# Patient Record
Sex: FEMALE | Race: AMERICAN INDIAN OR ALASKA NATIVE
[De-identification: names, ages, dates, MRNs, and addresses within clinical notes are randomized per-mention and may not be internally consistent; named-entity substitution may affect disease eponyms.]

---

## 2022-07-11 ENCOUNTER — HOSPITAL ENCOUNTER (INPATIENT)
Dept: HOSPITAL 5 - ED | Age: 77
LOS: 8 days | Discharge: NURSING, REHAB OR LONG-TERM CARE | DRG: 481 | End: 2022-07-19
Attending: INTERNAL MEDICINE | Admitting: HOSPITALIST
Payer: MEDICARE
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
## 2022-07-11 DIAGNOSIS — Z95.1: ICD-10-CM

## 2022-07-11 DIAGNOSIS — E78.5: ICD-10-CM

## 2022-07-11 DIAGNOSIS — D72.829: ICD-10-CM
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
## 2022-07-11 DIAGNOSIS — I10: ICD-10-CM

## 2022-07-11 DIAGNOSIS — Z20.822: ICD-10-CM

## 2022-07-11 DIAGNOSIS — Z82.49: ICD-10-CM

## 2022-07-11 DIAGNOSIS — E87.1: ICD-10-CM
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
## 2022-07-11 DIAGNOSIS — S72.142A: Primary | ICD-10-CM

## 2022-07-11 DIAGNOSIS — Z88.6: ICD-10-CM

## 2022-07-11 DIAGNOSIS — E66.9: ICD-10-CM

## 2022-07-11 LAB
BASOPHILS # (AUTO): 0.1 K/MM3 (ref 0–0.1)
BASOPHILS NFR BLD AUTO: 0.6 % (ref 0–1.8)
BUN SERPL-MCNC: 28 MG/DL (ref 7–17)
BUN/CREAT SERPL: 22 %
CALCIUM SERPL-MCNC: 9.3 MG/DL (ref 8.4–10.2)
EOSINOPHIL # BLD AUTO: 0.2 K/MM3 (ref 0–0.4)
EOSINOPHIL NFR BLD AUTO: 1.9 % (ref 0–4.3)
HCT VFR BLD CALC: 32.6 % (ref 30.3–42.9)
HEMOLYSIS INDEX: 4
HGB BLD-MCNC: 10.8 GM/DL (ref 10.1–14.3)
LYMPHOCYTES # BLD AUTO: 1.3 K/MM3 (ref 1.2–5.4)
LYMPHOCYTES NFR BLD AUTO: 9.8 % (ref 13.4–35)
MCHC RBC AUTO-ENTMCNC: 33 % (ref 30–34)
MCV RBC AUTO: 93 FL (ref 79–97)
MONOCYTES # (AUTO): 0.6 K/MM3 (ref 0–0.8)
MONOCYTES % (AUTO): 4.8 % (ref 0–7.3)
PLATELET # BLD: 187 K/MM3 (ref 140–440)
RBC # BLD AUTO: 3.49 M/MM3 (ref 3.65–5.03)

## 2022-07-11 PROCEDURE — 83735 ASSAY OF MAGNESIUM: CPT

## 2022-07-11 PROCEDURE — 84295 ASSAY OF SERUM SODIUM: CPT

## 2022-07-11 PROCEDURE — U0003 INFECTIOUS AGENT DETECTION BY NUCLEIC ACID (DNA OR RNA); SEVERE ACUTE RESPIRATORY SYNDROME CORONAVIRUS 2 (SARS-COV-2) (CORONAVIRUS DISEASE [COVID-19]), AMPLIFIED PROBE TECHNIQUE, MAKING USE OF HIGH THROUGHPUT TECHNOLOGIES AS DESCRIBED BY CMS-2020-01-R: HCPCS

## 2022-07-11 PROCEDURE — C1769 GUIDE WIRE: HCPCS

## 2022-07-11 PROCEDURE — 80053 COMPREHEN METABOLIC PANEL: CPT

## 2022-07-11 PROCEDURE — 85014 HEMATOCRIT: CPT

## 2022-07-11 PROCEDURE — 85018 HEMOGLOBIN: CPT

## 2022-07-11 PROCEDURE — 80048 BASIC METABOLIC PNL TOTAL CA: CPT

## 2022-07-11 PROCEDURE — 72131 CT LUMBAR SPINE W/O DYE: CPT

## 2022-07-11 PROCEDURE — 36415 COLL VENOUS BLD VENIPUNCTURE: CPT

## 2022-07-11 PROCEDURE — 94640 AIRWAY INHALATION TREATMENT: CPT

## 2022-07-11 PROCEDURE — 94760 N-INVAS EAR/PLS OXIMETRY 1: CPT

## 2022-07-11 PROCEDURE — C1713 ANCHOR/SCREW BN/BN,TIS/BN: HCPCS

## 2022-07-11 PROCEDURE — 85025 COMPLETE CBC W/AUTO DIFF WBC: CPT

## 2022-07-11 NOTE — CAT SCAN REPORT
CT LUMBAR SPINE WITHOUT CONTRAST



INDICATION:  fall with pain. 



TECHNIQUE:  Axial imaging performed through the lumbar spine without the use of contrast.  Sagittal a
nd coronal reconstructed images were also reviewed.  All CT scans at this location are performed usin
g CT dose reduction for ALARA by means of automated exposure control. 



COMPARISON:  None



FINDINGS: 



Alignment:  There is moderate levocurvature of the lumbar spine measuring approximately 20 degrees wi
th apex near the elbow 3 4 level. There is 3 mm anterolisthesis of L4 with respect to L5 on the sagit
jose luis images.  

Bones:  There is no acute osseous abnormality.  Moderate discogenic DJD is identified at L3-4 and L4-
5. 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 Large bridging right lateral osteophyte is
 identified at L3-4.  There is moderate diffuse facet arthropathy with hypertrophic changes. Moderate
 to severe central canal stenosis is suspected at L3-4 and L4-5. Bilateral neural foraminal narrowing
 is also suspected at L3-4 and L4-5. 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 

Soft tissues:  No acute or significant incidental soft tissue abnormality.



IMPRESSION:  Moderate levo curvature of the lumbar spine with moderate to severe multilevel degenerat
chuy changes as described. L3-4 and L4-5 appear to be the most affected levels. No acute injury is det
ected.



Signer Name: Vinh Mercado Jr, MD 

Signed: 7/11/2022 1:28 PM

Workstation Name: [id number]

## 2022-07-11 NOTE — CAT SCAN REPORT
CT LEFT HIP WITHOUT CONTRAST



INDICATION / CLINICAL INFORMATION: fall with pain.



TECHNIQUE: All CT scans at this location are performed using CT dose reduction for ALARA by means of 
automated exposure control.



COMPARISON: None available.



FINDINGS:



HIP JOINT: Mild DJD of the left hip

BONES: There is an acute basicervical fracture with mild impaction and moderate angulation. No osseou
s lesion.

SACROILIAC JOINT(S): No significant abnormality.

MUSCLES / TENDONS: No significant abnormality.

SOFT TISSUES: No significant abnormality.



LOWER LUMBAR SPINE: Scattered degenerative disc disease.

SOFT TISSUE WITHIN PELVIS: No acute findings.



ADDITIONAL FINDINGS: None.



IMPRESSION:

1. Acute basicervical fracture of the left proximal femur with mild impaction and moderate angulation
.



Signer Name: Ryder Land DO 

Signed: 7/11/2022 1:54 PM

Workstation Name: [id number]

## 2022-07-11 NOTE — HISTORY AND PHYSICAL REPORT
History of Present Illness


Date of examination: 07/11/22


Date of admission: 


07/11/22


Chief complaint: 





Fall


Left hip pain





History of present illness: 





7-year-old female with history of hypertension was brought to the emergency room

because of left hip pain 1 day.  Patient  was getting up from her couch and had

a mechanical trip and fell to the left hip area.  Patient denies any other 

injuries or trauma.  Denies any neck or back pain.  Denies any other complaints 

or symptoms.  Denies any LOC.  Denies denies any head injuries or trauma.  

Patient denies any numbness, tingling, fever, chills, nausea, vomiting, chest 

pain, shortness of breath, headache, stiff neck.  Patient denies any joint 

swelling or joint redness.  Patient states has decreased range of motion and 

gait due to pain.  





In the emergency room CT scan of the lower extremity shows acute fracture of the

left proximal femur with mild impaction and moderate angulation.  Subsequently 

Case was discussed with Dr. Valdez's were going to admit the patient orthopedic 

Dr. Vargas will see the patient in consultation.  Patient is a [facility] patient





Past History


Past Medical History: hypertension


Past Surgical History: No surgical history


Social history: no significant social history


Family history: hypertension





Medications and Allergies


                                    Allergies











Allergy/AdvReac Type Severity Reaction Status Date / Time


 


codeine Allergy  Unknown Verified 07/11/22 09:26











Active Meds: 


Active Medications





Acetaminophen (Acetaminophen 325 Mg Tab)  650 mg PO Q4H PRN


   PRN Reason: Pain MILD(1-3)/Fever >100.5/HA











Review of Systems


All systems: negative


Constitutional: other (Fall left hip pain)





Exam





- Constitutional


Vitals: 


                                        











Temp Pulse Resp BP Pulse Ox


 


 98.4 F   73   18   192/89   99 


 


 07/11/22 09:22  07/11/22 09:22  07/11/22 09:22  07/11/22 14:27  07/11/22 12:31











General appearance: Present: no acute distress, well-nourished





- EENT


Eyes: Present: PERRL


ENT: hearing intact, clear oral mucosa





- Neck


Neck: Present: supple, normal ROM





- Respiratory


Respiratory effort: normal


Respiratory: bilateral: CTA





- Cardiovascular


Heart Sounds: Present: S1 & S2.  Absent: rub, click





- Extremities


Extremities: pulses symmetrical, No edema


Extremity abnormal: other (Fall and left hip pain)


Peripheral Pulses: within normal limits





- Abdominal


General gastrointestinal: Present: soft, non-tender, non-distended, normal bowel

sounds


Female genitourinary: Present: normal





- Integumentary


Integumentary: Present: clear, warm, dry





- Musculoskeletal


Musculoskeletal: gait normal, strength equal bilaterally





- Psychiatric


Psychiatric: appropriate mood/affect, intact judgment & insight





- Neurologic


Neurologic: CNII-XII intact, moves all extremities





Results





- Labs


CBC & Chem 7: 


                                 07/11/22 10:30





                                 07/11/22 10:30


Labs: 


                             Laboratory Last Values











WBC  13.2 K/mm3 (4.5-11.0)  H  07/11/22  10:30    


 


RBC  3.49 M/mm3 (3.65-5.03)  L  07/11/22  10:30    


 


Hgb  10.8 gm/dl (10.1-14.3)   07/11/22  10:30    


 


Hct  32.6 % (30.3-42.9)   07/11/22  10:30    


 


MCV  93 fl (79-97)   07/11/22  10:30    


 


MCH  31 pg (28-32)   07/11/22  10:30    


 


MCHC  33 % (30-34)   07/11/22  10:30    


 


RDW  14.0 % (13.2-15.2)   07/11/22  10:30    


 


Plt Count  187 K/mm3 (140-440)   07/11/22  10:30    


 


Lymph % (Auto)  9.8 % (13.4-35.0)  L  07/11/22  10:30    


 


Mono % (Auto)  4.8 % (0.0-7.3)   07/11/22  10:30    


 


Eos % (Auto)  1.9 % (0.0-4.3)   07/11/22  10:30    


 


Baso % (Auto)  0.6 % (0.0-1.8)   07/11/22  10:30    


 


Lymph # (Auto)  1.3 K/mm3 (1.2-5.4)   07/11/22  10:30    


 


Mono # (Auto)  0.6 K/mm3 (0.0-0.8)   07/11/22  10:30    


 


Eos # (Auto)  0.2 K/mm3 (0.0-0.4)   07/11/22  10:30    


 


Baso # (Auto)  0.1 K/mm3 (0.0-0.1)   07/11/22  10:30    


 


Seg Neutrophils %  82.9 % (40.0-70.0)  H  07/11/22  10:30    


 


Seg Neutrophils #  10.9 K/mm3 (1.8-7.7)  H  07/11/22  10:30    


 


Sodium  128 mmol/L (137-145)  L  07/11/22  10:30    


 


Potassium  4.1 mmol/L (3.6-5.0)   07/11/22  10:30    


 


Chloride  94.0 mmol/L ()  L  07/11/22  10:30    


 


Carbon Dioxide  24 mmol/L (22-30)   07/11/22  10:30    


 


Anion Gap  14 mmol/L  07/11/22  10:30    


 


BUN  28 mg/dL (7-17)  H  07/11/22  10:30    


 


Creatinine  1.3 mg/dL (0.6-1.2)  H  07/11/22  10:30    


 


Estimated GFR  48 ml/min  07/11/22  10:30    


 


BUN/Creatinine Ratio  22 %  07/11/22  10:30    


 


Glucose  118 mg/dL ()  H  07/11/22  10:30    


 


Calcium  9.3 mg/dL (8.4-10.2)   07/11/22  10:30    














Assessment and Plan


VTE prophylaxis?: Chemical


Plan of care discussed with patient/family: Yes





- Patient Problems


(1) Femur fracture, left


Current Visit: Yes   Status: Acute   


Qualifiers: 


   Encounter type: initial encounter   Femur location: unspecified portion of 

femur   Fracture type: closed   Fracture morphology: unspecified fracture 

morphology   Qualified Code(s): S72.92XA - Unspecified fracture of left femur, 

initial encounter for closed fracture   


Plan to address problem: 


Admit the patient to the medical floor telemetry.  NPO.  D5 half-normal saline 

at the rate of 100 cc per hour.  Morphine 2 mg IV every 4 hours as needed.  

Tylenol 650 mg p.o. every 6 hours as needed.  Reconsult orthopedic Dr. Valdez to

see the patient for possible surgery








(2) Hypertension


Current Visit: Yes   Status: Acute   


Plan to address problem: 


Hydralazine 10 mg IV every 6 hours as needed.  We continue the home medication








(3) Fall


Current Visit: Yes   Status: Acute   


Plan to address problem: 


Patient is on fall precaution.  Will consult physical therapy for evaluation








(4) Hyponatremia


Current Visit: Yes   Status: Acute   


Plan to address problem: 


D5 half-normal saline at the rate of 100 cc/h.  We will monitor the sodium 

closely.  Recheck BMP in the morning








(5) DVT prophylaxis


Current Visit: Yes   Status: Acute   


Plan to address problem: 


Heparin 5000 units subcu every 12 hours for DVT prophylaxis.  Pepcid 20 mg IV 

every 12 hours for GI prophylaxis.  Patient is a full code

## 2022-07-11 NOTE — EMERGENCY DEPARTMENT REPORT
ED Lower Extremity HPI





- General


Chief Complaint: Extremity Injury, Lower


Stated Complaint: FALL/LEFT HIP PAIN


Time Seen by Provider: 22 09:59


Source: patient, EMS


Mode of arrival: Stretcher


Limitations: Physical Limitation





- History of Present Illness


Initial Comments: 





This is a 77-year-old female nontoxic, well nourished in appearance, presents to

the ED with c/o of left hip pain 1 day.  Patient stated that last night she was

getting up from her couch and had a mechanical trip and fell to the left hip 

area.  Patient denies any other injuries or trauma.  Denies any neck or back 

pain.  Denies any other complaints or symptoms.  Denies any LOC.  Denies denies 

any head injuries or trauma.  Patient denies any numbness, tingling, fever, 

chills, nausea, vomiting, chest pain, shortness of breath, headache, stiff neck.

 Patient denies any joint swelling or joint redness.  Patient states has 

decreased range of motion and gait due to pain.  Patient stated allergies to 

codeine but stated has taken Morphin in the past without any allergies.


MD Complaint: hip injury


-: days(s) (1)


Injury: Hip: Left


Place: home


Severity: mild


Severity scale (0 -10): 8


Improves With: immobilization


Worsens With: weight bearing, movement, palpation


Context: fall


Associated Symptoms: unable to bear weight.  denies: snap/pop sensation, 

swelling, numbness, tingling





- Related Data


                                    Allergies











Allergy/AdvReac Type Severity Reaction Status Date / Time


 


codeine Allergy  Unknown Verified 22 09:26














ED Review of Systems


ROS: 


Stated complaint: FALL/LEFT HIP PAIN


Other details as noted in HPI





Comment: All other systems reviewed and negative


Constitutional: denies: chills, fever


Eyes: denies: eye pain, eye discharge, vision change


ENT: denies: ear pain, throat pain


Respiratory: denies: cough, shortness of breath, wheezing


Cardiovascular: denies: chest pain, palpitations


Endocrine: no symptoms reported


Gastrointestinal: denies: abdominal pain, nausea, diarrhea


Genitourinary: denies: urgency, dysuria, discharge


Musculoskeletal: denies: back pain, joint swelling, arthralgia


Skin: denies: rash, lesions


Neurological: denies: headache, weakness, paresthesias


Psychiatric: denies: anxiety, depression


Hematological/Lymphatic: denies: easy bleeding, easy bruising





ED Physical Exam





- General


General appearance: alert, in no apparent distress





- Head


Head exam: Present: atraumatic, normocephalic





- Eye


Eye exam: Present: normal appearance, PERRL, EOMI


Pupils: Present: normal accommodation





- ENT


ENT exam: Present: normal exam





- Neck


Neck exam: Present: normal inspection, full ROM.  Absent: tenderness, 

meningismus, lymphadenopathy





- Respiratory


Respiratory exam: Present: normal lung sounds bilaterally.  Absent: respiratory 

distress, wheezes, rales, rhonchi, stridor, chest wall tenderness, accessory 

muscle use, decreased breath sounds, prolonged expiratory





- Cardiovascular


Cardiovascular Exam: Present: regular rate, normal rhythm, normal heart sounds. 

Absent: bradycardia, tachycardia, irregular rhythm, systolic murmur, diastolic 

murmur, rubs, gallop





- GI/Abdominal


GI/Abdominal exam: Present: soft, normal bowel sounds.  Absent: distended, 

tenderness, guarding, rebound, rigid, diminished bowel sounds





- Extremities Exam


Extremities exam: Present: tenderness, normal capillary refill.  Absent: pedal 

edema, joint swelling, calf tenderness





- Expanded Lower Extremity Exam


  ** Left


Hip exam: Present: full ROM (bilateral exam: with pain), tenderness (left hip 

only), external rotation (bilateral exam), internal rotation (bilateral exam), 

pelvic stability (bilateral exam).  Absent: swelling (bilateral exam), abrasion 

(bilateral exam), laceration (bilateral exam), ecchymosis (bilateral exam), 

deformity (bilateral exam), crepidus (bilateral exam), dislocation (bilateral 

exam), erythema (bilateral exam), shortening (bilateral exam)


Upper Leg exam: Present: normal inspection (bilateral exam), full ROM (bilateral

exam).  Absent: tenderness (bilateral exam), swelling (bilateral exam)


Knee exam: Present: normal inspection (bilateral exam), full ROM (bilateral 

exam).  Absent: tenderness (bilateral exam), swelling (bilateral exam)


Lower Leg exam: Present: normal inspection (bilateral exam), full ROM (bilateral

exam).  Absent: tenderness, swelling


Ankle exam: Present: normal inspection (bilateral exam), full ROM (bilateral 

exam).  Absent: tenderness (bilateral exam), swelling (bilateral exam)


Foot/Toe exam: Present: normal inspection (bilateral exam), full ROM (bilateral 

exam).  Absent: tenderness (bilateral exam), swelling (bilateral exam)


Neuro vascular tendon exam: Present: no vascular compromise (bilateral exam)


Gait: Positive: unable to bear weight (left hip)





- Back Exam


Back exam: Present: normal inspection, full ROM.  Absent: tenderness, CVA 

tenderness (R), CVA tenderness (L), muscle spasm, paraspinal tenderness, 

vertebral tenderness, rash noted





- Neurological Exam


Neurological exam: Present: alert, oriented X3





- Psychiatric


Psychiatric exam: Present: normal affect, normal mood





- Skin


Skin exam: Present: warm, dry, intact, normal color.  Absent: rash





ED Course


                                   Vital Signs











  22





  09:22 10:12 10:15


 


Temperature 98.4 F  


 


Pulse Rate 73  


 


Respiratory 18  





Rate   


 


Blood Pressure   216/71


 


Blood Pressure 227/94  





[Left]   


 


O2 Sat by Pulse 97 99 100





Oximetry   














  22





  10:31 10:45 11:01


 


Temperature   


 


Pulse Rate   


 


Respiratory   





Rate   


 


Blood Pressure 209/78 209/78 227/77


 


Blood Pressure   





[Left]   


 


O2 Sat by Pulse 99 99 98





Oximetry   














  22





  11:15 11:31 11:45


 


Temperature   


 


Pulse Rate   


 


Respiratory   





Rate   


 


Blood Pressure 224/71 172/61 172/61


 


Blood Pressure   





[Left]   


 


O2 Sat by Pulse 99 99 98





Oximetry   














  22





  12:01 12:15 12:31


 


Temperature   


 


Pulse Rate   


 


Respiratory   





Rate   


 


Blood Pressure 166/55 191/70 210/84


 


Blood Pressure   





[Left]   


 


O2 Sat by Pulse 97 99 99





Oximetry   














  22





  13:28 13:38 13:47


 


Temperature   


 


Pulse Rate   


 


Respiratory   





Rate   


 


Blood Pressure 269/169 187/76 189/82


 


Blood Pressure   





[Left]   


 


O2 Sat by Pulse   





Oximetry   














  22





  14:08 14:27


 


Temperature  


 


Pulse Rate  


 


Respiratory  





Rate  


 


Blood Pressure 191/90 192/89


 


Blood Pressure  





[Left]  


 


O2 Sat by Pulse  





Oximetry  














- Reevaluation(s)


Reevaluation #1: 





22 10:40


Patient is speaking in full sentences with no signs of distress noted.





- Consultations


Consultation #1: 





22 14:46


Patient has been consulted with Dr. Graham about patient history, physical exam, 

and labs/imaging results and agrees to ED plan of care with consult to 

orthopedic and admission.


Consultation #2: 





22 14:52


Spoke with Dr. Valdez office and given patient information and was instructed 

that Dr. Valdez will come and visit patient after office clinic.


Consultation #3: 





22 17:16


Patient has been consulted with Dr. Valdez about patient history, physical exam,

 and labs/imaging results and agrees for admission for surgery.








22 17:22


Patient is a [facility] Patient.  I will contact [facility] for approval for admissions.





22 17:36


Patient has been consulted with Dr. Wheeler ([facility]) about patient history, 

physical exam, and labs/imaging studies and patient to be transferred to [facility] for further evaluation and appropriate treatment.





22 18:30


Patient accepted by Dr. Garcia ([place] [facility]).











ED Lower Extremity MDM





- Lab Data


Result diagrams: 


                                 22 10:30





                                 22 10:30





- Radiology Data





[facility]  


                                     [address] [place] [address]  


 


                                          Cat Scan Report   


                                               Signed  


 


Patient: LUISA GONSALES                                                         

       MR#: [id number]  


89          


: 1945                                                                

Acct:[id number]      


 


Age/Sex: 77 / F                                                                

ADM Date: 22     


 


Loc: ED       


Attending Dr:   


 


 


Ordering Physician: ROMEL CLARK NP  


Date of Service: 22  


Procedure(s): CT lower extremity LT wo con  


Accession Number(s): [id number]  


 


cc: ROMEL CLARK NP   


 


 


CT LEFT HIP WITHOUT CONTRAST  


 


 INDICATION / CLINICAL INFORMATION: fall with pain.  


 


 TECHNIQUE: All CT scans at this location are performed using CT dose reduction 

for ALARA by means 


of automated exposure control.  


 


 COMPARISON: None available.  


 


 FINDINGS:  


 


 HIP JOINT: Mild DJD of the left hip  


 BONES: There is an acute basicervical fracture with mild impaction and moderate

 angulation. No 


osseous lesion.  


 SACROILIAC JOINT(S): No significant abnormality.  


 MUSCLES / TENDONS: No significant abnormality.  


 SOFT TISSUES: No significant abnormality.  


 


 LOWER LUMBAR SPINE: Scattered degenerative disc disease.  


 SOFT TISSUE WITHIN PELVIS: No acute findings.  


 


 ADDITIONAL FINDINGS: None.  


 


 IMPRESSION:  


 1. Acute basicervical fracture of the left proximal femur with mild impaction 

and moderate 


angulation.  


 


 Signer Name: Ryder Zambrano DO   


 Signed: 2022 1:54 PM  


 Workstation Name: [id number]   


 


 


Transcribed By: NS  


Dictated By: RYDER ZAMBRANO DO  


Electronically Authenticated By: RYDER ZAMBRANO DO    


Signed Date/Time: 22 1354                                


 


 


 


DD/DT: 22 1348                                                            

  


TD/TT:





[facility]  


                                     [address] [place] [address]  


 


                                          Cat Scan Report   


                                               Signed  


 


Patient: LUISA GONSALES                                                         

       MR#: [id number]  


89          


: 1945                                                                

Acct:[id number]      


 


Age/Sex: 77 / F                                                                

ADM Date: 22     


 


Loc: ED       


Attending Dr:   


 


 


Ordering Physician: ROMEL CLARK NP  


Date of Service: 22  


Procedure(s): CT lumbar spine wo con  


Accession Number(s): [id number]  


 


cc: ROMEL CLARK NP   


 


 


CT LUMBAR SPINE WITHOUT CONTRAST  


 


 INDICATION:  fall with pain.   


 


 TECHNIQUE:  Axial imaging performed through the lumbar spine without the use of

 contrast.  Sagittal


and coronal reconstructed images were also reviewed.  All CT scans at this 

location are performed 


using CT dose reduction for ALARA by means of automated exposure control.   


 


 COMPARISON:  None  


 


 FINDINGS:   


 


 Alignment:  There is moderate levocurvature of the lumbar spine measuring 

approximately 20 degrees 


with apex near the elbow 3 4 level. There is 3 mm anterolisthesis of L4 with 

respect to L5 on the 


sagittal images.    


 Bones:  There is no acute osseous abnormality.  Moderate discogenic DJD is 

identified at L3-4 and 


L4-5. 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 Large bridging 

right lateral 


osteophyte is identified at L3-4.  There is moderate diffuse facet arthropathy 

with hypertrophic 


changes. Moderate to severe central canal stenosis is suspected at L3-4 and L4-

5. Bilateral neural 


foraminal narrowing is also suspected at L3-4 and L4-5. Ankylosis of the 

superior right SI joint is 


present.   


 Soft tissues:  No acute or significant incidental soft tissue abnormality.  


 


 IMPRESSION:  Moderate levo curvature of the lumbar spine with moderate to 

severe multilevel 


degenerative changes as described. L3-4 and L4-5 appear to be the most affected 

levels. No acute 


injury is detected.  


 


 Signer Name: Vinh Mercado Jr, MD   


 Signed: 2022 1:28 PM  


 Workstation Name: [id number]   


 


 


Transcribed By: TTR  


Dictated By: VINH MERCADO JR, MD  


Electronically Authenticated By: VINH MERCADO JR, MD    


Signed Date/Time: 22                                


 


 


 


DD/DT: 22                                                            

  


TD/TT:





- Medical Decision Making





77-year-old female that presents with left hip fracture.  Patient is stable and 

was examined by me.  Patient is notified of the CT results with no questions 

noted by the patient.  At this time patient is pain is under control.  Vital 

signs are stable.  Patient transferred to [facility] for further 

evaluation and treatment.  At time of transport, the patient does not seem toxic

 or ill in appearance.  No acute signs of distress noted.  Patient agrees to 

transfer treatment plan of care.  No further questions noted by the patient.


Critical care attestation.: 


If time is entered above; I have spent that time in minutes in the direct care 

of this critically ill patient, excluding procedure time.








ED Disposition


Clinical Impression: 


Femur fracture, left


Qualifiers:


 Encounter type: initial encounter Femur location: unspecified portion of femur 

Fracture type: closed Fracture morphology: unspecified fracture morphology 

Qualified Code(s): S72.92XA - Unspecified fracture of left femur, initial 

encounter for closed fracture





Disposition: 04 [facility] CARE FACILITY


Is pt being admited?: No


Condition: Stable


Time of Disposition: 18:33

## 2022-07-12 LAB
ALBUMIN SERPL-MCNC: 3.6 G/DL (ref 3.9–5)
ALT SERPL-CCNC: 15 UNITS/L (ref 7–56)
BASOPHILS # (AUTO): 0.1 K/MM3 (ref 0–0.1)
BASOPHILS NFR BLD AUTO: 0.6 % (ref 0–1.8)
BUN SERPL-MCNC: 24 MG/DL (ref 7–17)
BUN/CREAT SERPL: 22 %
CALCIUM SERPL-MCNC: 9.5 MG/DL (ref 8.4–10.2)
EOSINOPHIL # BLD AUTO: 0.4 K/MM3 (ref 0–0.4)
EOSINOPHIL NFR BLD AUTO: 2.7 % (ref 0–4.3)
HCT VFR BLD CALC: 35.4 % (ref 30.3–42.9)
HEMOLYSIS INDEX: 28
HGB BLD-MCNC: 11.7 GM/DL (ref 10.1–14.3)
LYMPHOCYTES # BLD AUTO: 1.3 K/MM3 (ref 1.2–5.4)
LYMPHOCYTES NFR BLD AUTO: 8.8 % (ref 13.4–35)
MCHC RBC AUTO-ENTMCNC: 33 % (ref 30–34)
MCV RBC AUTO: 94 FL (ref 79–97)
MONOCYTES # (AUTO): 0.7 K/MM3 (ref 0–0.8)
MONOCYTES % (AUTO): 4.8 % (ref 0–7.3)
PLATELET # BLD: 195 K/MM3 (ref 140–440)
RBC # BLD AUTO: 3.76 M/MM3 (ref 3.65–5.03)

## 2022-07-12 PROCEDURE — 0QS736Z REPOSITION LEFT UPPER FEMUR WITH INTRAMEDULLARY INTERNAL FIXATION DEVICE, PERCUTANEOUS APPROACH: ICD-10-PCS | Performed by: ORTHOPAEDIC SURGERY

## 2022-07-12 RX ADMIN — CEFTRIAXONE SODIUM SCH MLS/HR: 2 INJECTION, POWDER, FOR SOLUTION INTRAMUSCULAR; INTRAVENOUS at 11:37

## 2022-07-12 RX ADMIN — DEXTROSE AND SODIUM CHLORIDE SCH MLS/HR: 5; .45 INJECTION, SOLUTION INTRAVENOUS at 18:01

## 2022-07-12 RX ADMIN — MORPHINE SULFATE PRN MG: 4 INJECTION, SOLUTION INTRAMUSCULAR; INTRAVENOUS at 03:46

## 2022-07-12 RX ADMIN — FAMOTIDINE SCH MG: 10 INJECTION, SOLUTION INTRAVENOUS at 10:12

## 2022-07-12 RX ADMIN — Medication SCH: at 10:10

## 2022-07-12 RX ADMIN — MORPHINE SULFATE PRN MG: 4 INJECTION, SOLUTION INTRAMUSCULAR; INTRAVENOUS at 10:12

## 2022-07-12 NOTE — XRAY REPORT
LEFT HIP 3 VIEW(S)



INDICATION / CLINICAL INFORMATION: left hip pain



COMPARISON: CT yesterday

 

FINDINGS:



BONES / JOINT(S): Proximal left femur fracture is again noted at the inferior femoral neck with exten
yanely into the intertrochanteric region and greater trochanter. Osteopenia and advanced osteoarthrosis
 changes are noted.

SOFT TISSUES: No significant abnormality.



ADDITIONAL FINDINGS: None.



IMPRESSION:

1. Proximal left femur fracture involving the basicervical neck extending into the intertrochanteric 
and greater trochanteric regions.



Signer Name: Phillip Marquez MD 

Signed: 7/12/2022 11:37 AM

Workstation Name: [id number]

## 2022-07-12 NOTE — ANESTHESIA CONSULTATION
Anesthesia Consult and Med Hx


Date of service: 07/12/22





- Airway


Anesthetic Teeth Evaluation: Poor (2 bottom incisors. Denies any loose teeth.), 

Dentures (upper dentures)


ROM Head & Neck: Adequate


Mental/Hyoid Distance: Adequate


Mallampati Class: Class II


Intubation Access Assessment: Probably Good





- Pulmonary Exam


CTA: Yes





- Cardiac Exam


Cardiac Exam: RRR





- Pre-Operative Health Status


ASA Pre-Surgery Classification: ASA3, Emergency


Proposed Anesthetic Plan: General





- Pulmonary


Hx Smoking: No


Hx Asthma: No


Hx Respiratory Symptoms: No


SOB: No


Home Oxygen Therapy: No


Hx Sleep Apnea: No (High risk )





- Cardiovascular System


Hx Hypertension: Yes (HLD)


Hx Coronary Artery Disease: Yes


Hx Heart Attack/AMI: Yes (CABG several years ago, x1 stent placed in 2016)


Hx Cardia Arrhythmia: No


Hx Peripheral Vascular Disease: No





- Central Nervous System


Hx Seizures: No


CVA: No





- Gastrointestinal


Hx Gastroesophageal Reflux Disease: No





- Endocrine


Hx Renal Disease: No


Hx Liver Disease: No


Hx Non-Insulin Dependent Diabetes: No


Hx Thyroid Disease: No





- Hematic


Hx Anemia: No


Hx Sickle Cell Disease: No





- Other Systems


Hx Alcohol Use: No


Hx Substance Use: No


Hx Cancer: No


Hx Obesity: Yes (BMI 31)





- Additional Comments


Anesthesia Medical History Comments: no hx of anesthetic complications.

## 2022-07-12 NOTE — XRAY REPORT
LEFT FEMUR 2 VIEWS



INDICATION:  IM NAILING LEFT FENUR. 



COMPARISON: None.



IMPRESSION:  1.2 minutes of fluoroscopy time was provided by radiology during internal fixation of a 
proximal left femur fracture.  2 fluoroscopic images are presented demonstrating anatomic alignment a
t the fracture site.



Signer Name: Vinh Mercado Jr, MD 

Signed: 7/12/2022 3:51 PM

Workstation Name: [id number]

## 2022-07-12 NOTE — PROCEDURE NOTE
Date of procedure: 07/12/22


Pre-op diagnosis: left intertrochanter fracture hip


Post-op diagnosis: same


Procedure: 


Closed reduction insertion of intramedullary nail [left] femur





Procedure


The patient was brought to the OR and placed on the OR table, following 

intubation she was transferred onto the [place] table supine the legs were placed 

in longitudinal traction The C-arm fluoroscope was brought in and the hip was 

reduced in both the AP and lateral planes.  Next the [left] hip was prepped and 

draped in the usual sterile manner a stab wound was made posterior and superior 

to the greater trochanter this is carried down sharply through skin and fascia 

using a Mckeon elevator the soft tissues were split down to the tip of the greater

 trochanter A large awl was used to enter the proximal medullary canal this was 

followed by placement of the guidewire again under C-arm visualization the tip 

of the guidewire was seen in the distal femur next the measurements were 

obtained a 11 x 380 intramedullary nail was selected this was followed by 

reaming up to a 12-1/2 mm diameter following this the intramedullary nail was 

inserted and a antegrade fashion down the proximal canal into the distal femur 

next the targeting device for the Gamma nail was placed and the stab wound was 

made along the lateral border of the thigh again under C-arm visualization a 

guidewire was inserted into the femoral neck again measuring this delay a 90 mm 

Gamma nail was chosen the forearm or near cortex was drilled followed by 

insertion of the gamma nail next the locking screw proximally was engaged again 

under C-arm direction AP and lateral views were obtained showing good reduction 

at the fracture and placement of the hardware following this a wound was 

copiously irrigated and was closed in a standard routine fashion and the patient

 tolerated the procedure there were no complications and he was sent to 

postanesthesia recovery in stable condition





Anesthesia: GETA


Surgeon: CHARLEY POPE


Estimated blood loss: 50-100ml


Pathology: list


Condition: stable


Disposition: PACU

## 2022-07-12 NOTE — CONSULTATION
History of Present Illness





- HPI


Consult date: 07/12/22


Consult reason: joint pain, fracture


History of present illness: 





78 y/o female with c/o left hip pain after at home prior to admission, states 

she fell that night but after about 12 hrs pain became unbearable and came to ED

at [facility] where xrays taken revealed a left intertrochanteric fx... no c/o's 

noted...





Past History


Past Medical History: hypertension


Past Surgical History: No surgical history


Social history: no significant social history


Family history: hypertension





Medications and Allergies


                                    Allergies











Allergy/AdvReac Type Severity Reaction Status Date / Time


 


codeine Allergy  Unknown Verified 07/11/22 09:26











Active Meds: 


Active Medications





Acetaminophen (Acetaminophen 325 Mg Tab)  650 mg PO Q4H PRN


   PRN Reason: Pain MILD(1-3)/Fever >100.5/HA


Albuterol (Albuterol 2.5 Mg/3 Ml Nebu)  2.5 mg IH Q3HRT PRN


   PRN Reason: Shortness Of Breath


Famotidine (Famotidine 20 Mg/2 Ml Inj)  20 mg IV BID LEA


   Last Admin: 07/12/22 10:12 Dose:  20 mg


   


Heparin Sodium (Porcine) (Heparin 5,000 Unit/1 Ml Vial)  5,000 unit SUB-Q Q12HR 

LEA


   Last Admin: 07/12/22 10:54 Dose:  Not Given


   


Hydralazine HCl (Hydralazine 20 Mg/1 Ml Inj)  10 mg IV PRN PRN


   PRN Reason: Hypertension


Dextrose/Sodium Chloride (D5/0.45ns)  1,000 mls @ 100 mls/hr IV AS DIRECT LEA


Ceftriaxone Sodium (Rocephin/Ns 2 Gm/100 Ml)  2 gm in 100 mls @ 200 mls/hr IV 

Q24H LEA; Protocol


   Last Infusion: 07/12/22 12:07 Dose:  Infused


   


Morphine Sulfate (Morphine 2 Mg/1 Ml Inj)  2 mg IV Q4H PRN


   PRN Reason: Pain, Moderate (4-6)


Morphine Sulfate (Morphine 4 Mg/1 Ml Inj)  4 mg IV Q4H PRN


   PRN Reason: Pain , Severe (7-10)


   Last Admin: 07/12/22 10:12 Dose:  4 mg


   


Ondansetron HCl (Ondansetron 4 Mg/2 Ml Inj)  4 mg IV Q8H PRN


   PRN Reason: Nausea And Vomiting


Sodium Chloride (Sodium Chloride 0.9% 10 Ml Flush Syringe)  10 ml IV BID LEA


Sodium Chloride (Sodium Chloride 0.9% 10 Ml Flush Syringe)  10 ml IV PRN PRN


   PRN Reason: LINE FLUSH











Physical Examination





- Physical exam


Narrative exam: 





left LE - + shortened and ext rotated, mild swelling proximally, tender prox 

thigh, dec AROM, distal n/v intact





Assessment and Plan





Left intertrochanteric hip fracture


recommend - IM nail fixation, followed by PT, etc...

## 2022-07-12 NOTE — PROGRESS NOTE
Assessment and Plan


Assessment and plan: 





7-year-old female with history of hypertension was brought to the emergency room

because of left hip pain 1 day.  Patient  was getting up from her couch and had

a mechanical trip and fell to the left hip area.  Patient denies any other 

injuries or trauma.  Denies any neck or back pain.  Denies any other complaints 

or symptoms.  Denies any LOC.  Denies denies any head injuries or trauma.  

Patient denies any numbness, tingling, fever, chills, nausea, vomiting, chest 

pain, shortness of breath, headache, stiff neck.  Patient denies any joint 

swelling or joint redness.  Patient states has decreased range of motion and 

gait due to pain.  





In the emergency room CT scan of the lower extremity shows acute fracture of the

left proximal femur with mild impaction and moderate angulation.  Subsequently C

ase was discussed with Dr. Valdez's were going to admit the patient orthopedic 

Dr. Vargas will see the patient in consultation.  Patient is a [place] patient








- Patient Problems


(1) Femur fracture, left


Current Visit: Yes   Status: Acute   


Qualifiers: 


   Encounter type: initial encounter   Femur location: unspecified portion of 

femur   Fracture type: closed   Fracture morphology: unspecified fracture 

morphology   Qualified Code(s): S72.92XA - Unspecified fracture of left femur, 

initial encounter for closed fracture   


Plan to address problem: 


Admit the patient to the medical floor telemetry.  NPO.  D5 half-normal saline 

at the rate of 100 cc per hour.  Morphine 2 mg IV every 4 hours as needed.  

Tylenol 650 mg p.o. every 6 hours as needed.  Reconsult orthopedic Dr. Valdez to

see the patient for possible surgery








(2) Hypertension


Current Visit: Yes   Status: Acute   


Plan to address problem: 


Hydralazine 10 mg IV every 6 hours as needed.  We continue the home medication








(3) Fall


Current Visit: Yes   Status: Acute   


Plan to address problem: 


Patient is on fall precaution.  Will consult physical therapy for evaluation








(4) Hyponatremia


Current Visit: Yes   Status: Acute   


Plan to address problem: 


D5 half-normal saline at the rate of 100 cc/h.  We will monitor the sodium 

closely.  Recheck BMP in the morning








(5) Leukocytosis


Current Visit: Yes   Status: Acute   


Plan to address problem: 


Rocephin 2 g IV daily.  Recheck CBC in the morning








(6) DVT prophylaxis


Current Visit: Yes   Status: Acute   


Plan to address problem: 


Heparin 5000 units subcu every 12 hours for DVT prophylaxis.  Pepcid 20 mg IV ev

kenny 12 hours for GI prophylaxis.  Patient is a full code








History


Interval history: 





Patient is seen and examined.  Lab reviewed.  Patient complained of pain in the 

left hip.  No chest pain or shortness of breath.  Waiting for evaluation by 

orthopedic surgeon Dr. Valdez





Hospitalist Physical





- Constitutional


Vitals: 


                                        











Temp Pulse Resp BP Pulse Ox


 


 98.0 F   89   20   162/126   94 


 


 07/12/22 02:01  07/12/22 02:01  07/12/22 02:01  07/12/22 07:15  07/12/22 07:15











General appearance: Present: no acute distress, well-nourished





- EENT


Eyes: Present: PERRL, EOM intact


ENT: hearing intact, clear oral mucosa, dentition normal





- Neck


Neck: Present: supple, normal ROM





- Respiratory


Respiratory: bilateral: CTA





- Cardiovascular


Rhythm: regular


Heart Sounds: Present: S1 & S2





- Extremities


Extremities: no ischemia


Extremity abnormal: other (Pain in the left hip)


Peripheral Pulses: within normal limits





- Abdominal


General gastrointestinal: soft, non-tender, non-distended, normal bowel sounds





- Integumentary


Integumentary: Present: clear, warm, dry





- Psychiatric


Psychiatric: appropriate mood/affect, intact judgment & insight





- Neurologic


Neurologic: CNII-XII intact, moves all extremities





Results





- Labs


CBC & Chem 7: 


                                 07/12/22 05:32





                                 07/12/22 05:32


Labs: 


                             Laboratory Last Values











WBC  14.3 K/mm3 (4.5-11.0)  H  07/12/22  05:32    


 


RBC  3.76 M/mm3 (3.65-5.03)   07/12/22  05:32    


 


Hgb  11.7 gm/dl (10.1-14.3)   07/12/22  05:32    


 


Hct  35.4 % (30.3-42.9)   07/12/22  05:32    


 


MCV  94 fl (79-97)   07/12/22  05:32    


 


MCH  31 pg (28-32)   07/12/22  05:32    


 


MCHC  33 % (30-34)   07/12/22  05:32    


 


RDW  14.4 % (13.2-15.2)   07/12/22  05:32    


 


Plt Count  195 K/mm3 (140-440)   07/12/22  05:32    


 


Lymph % (Auto)  8.8 % (13.4-35.0)  L  07/12/22  05:32    


 


Mono % (Auto)  4.8 % (0.0-7.3)   07/12/22  05:32    


 


Eos % (Auto)  2.7 % (0.0-4.3)   07/12/22  05:32    


 


Baso % (Auto)  0.6 % (0.0-1.8)   07/12/22  05:32    


 


Lymph # (Auto)  1.3 K/mm3 (1.2-5.4)   07/12/22  05:32    


 


Mono # (Auto)  0.7 K/mm3 (0.0-0.8)   07/12/22  05:32    


 


Eos # (Auto)  0.4 K/mm3 (0.0-0.4)   07/12/22  05:32    


 


Baso # (Auto)  0.1 K/mm3 (0.0-0.1)   07/12/22  05:32    


 


Seg Neutrophils %  83.1 % (40.0-70.0)  H  07/12/22  05:32    


 


Seg Neutrophils #  11.9 K/mm3 (1.8-7.7)  H  07/12/22  05:32    


 


Sodium  135 mmol/L (137-145)  L D 07/12/22  05:32    


 


Potassium  4.3 mmol/L (3.6-5.0)   07/12/22  05:32    


 


Chloride  98.4 mmol/L ()   07/12/22  05:32    


 


Carbon Dioxide  22 mmol/L (22-30)   07/12/22  05:32    


 


Anion Gap  19 mmol/L  07/12/22  05:32    


 


BUN  24 mg/dL (7-17)  H  07/12/22  05:32    


 


Creatinine  1.1 mg/dL (0.6-1.2)   07/12/22  05:32    


 


Estimated GFR  58 ml/min  07/12/22  05:32    


 


BUN/Creatinine Ratio  22 %  07/12/22  05:32    


 


Glucose  96 mg/dL ()   07/12/22  05:32    


 


Calcium  9.5 mg/dL (8.4-10.2)   07/12/22  05:32    


 


Total Bilirubin  0.90 mg/dL (0.1-1.2)   07/12/22  05:32    


 


AST  27 units/L (5-40)   07/12/22  05:32    


 


ALT  15 units/L (7-56)   07/12/22  05:32    


 


Alkaline Phosphatase  85 units/L ()   07/12/22  05:32    


 


Total Protein  6.8 g/dL (6.3-8.2)   07/12/22  05:32    


 


Albumin  3.6 g/dL (3.9-5)  L  07/12/22  05:32    


 


Albumin/Globulin Ratio  1.1 %  07/12/22  05:32    














Active Medications





- Current Medications


Current Medications: 














Generic Name Dose Route Start Last Admin





  Trade Name Freq  PRN Reason Stop Dose Admin


 


Acetaminophen  650 mg  07/11/22 22:39 





  Acetaminophen 325 Mg Tab  PO  





  Q4H PRN  





  Pain MILD(1-3)/Fever >100.5/HA  


 


Albuterol  2.5 mg  07/11/22 22:39 





  Albuterol 2.5 Mg/3 Ml Nebu  IH  





  Q3HRT PRN  





  Shortness Of Breath  


 


Famotidine  20 mg  07/12/22 10:00 





  Famotidine 20 Mg/2 Ml Inj  IV  





  BID [facility]  


 


Heparin Sodium (Porcine)  5,000 unit  07/12/22 10:00 





  Heparin 5,000 Unit/1 Ml Vial  SUB-Q  





  Q12HR [facility]  


 


Hydralazine HCl  10 mg  07/12/22 04:17 





  Hydralazine 20 Mg/1 Ml Inj  IV  





  PRN PRN  





  Hypertension  


 


Dextrose/Sodium Chloride  1,000 mls @ 100 mls/hr  07/11/22 23:00 





  D5/0.45ns  IV  





  AS DIRECT [facility]  


 


Morphine Sulfate  2 mg  07/11/22 22:39 





  Morphine 2 Mg/1 Ml Inj  IV  





  Q4H PRN  





  Pain, Moderate (4-6)  


 


Morphine Sulfate  4 mg  07/11/22 22:39  07/12/22 03:46





  Morphine 4 Mg/1 Ml Inj  IV   4 mg





  Q4H PRN   Administration





  Pain , Severe (7-10)  


 


Ondansetron HCl  4 mg  07/11/22 22:39 





  Ondansetron 4 Mg/2 Ml Inj  IV  





  Q8H PRN  





  Nausea And Vomiting  


 


Sodium Chloride  10 ml  07/12/22 10:00 





  Sodium Chloride 0.9% 10 Ml Flush Syringe  IV  





  BID [facility]  


 


Sodium Chloride  10 ml  07/11/22 22:39 





  Sodium Chloride 0.9% 10 Ml Flush Syringe  IV  





  PRN PRN  





  LINE FLUSH  














Nutrition/Malnutrition Assess





- Malnutrition Assessment


Minimum of two criteria: No physical signs of malnutrition





- Attestation Statement


I have reviewed and agreed w/ Malnutrition eval & tx plan: Yes

## 2022-07-13 LAB
HCT VFR BLD CALC: 32.5 % (ref 30.3–42.9)
HGB BLD-MCNC: 10.4 GM/DL (ref 10.1–14.3)

## 2022-07-13 RX ADMIN — FAMOTIDINE SCH MG: 10 TABLET ORAL at 09:52

## 2022-07-13 RX ADMIN — ENOXAPARIN SODIUM SCH MG: 100 INJECTION SUBCUTANEOUS at 09:52

## 2022-07-13 RX ADMIN — Medication SCH ML: at 09:52

## 2022-07-13 RX ADMIN — FAMOTIDINE SCH MG: 10 INJECTION, SOLUTION INTRAVENOUS at 00:08

## 2022-07-13 RX ADMIN — DEXTROSE AND SODIUM CHLORIDE SCH MLS/HR: 5; .45 INJECTION, SOLUTION INTRAVENOUS at 11:23

## 2022-07-13 RX ADMIN — MORPHINE SULFATE PRN MG: 2 INJECTION, SOLUTION INTRAMUSCULAR; INTRAVENOUS at 00:21

## 2022-07-13 RX ADMIN — FAMOTIDINE SCH MG: 10 TABLET ORAL at 23:09

## 2022-07-13 RX ADMIN — FAMOTIDINE SCH: 10 INJECTION, SOLUTION INTRAVENOUS at 09:52

## 2022-07-13 RX ADMIN — MORPHINE SULFATE PRN MG: 2 INJECTION, SOLUTION INTRAMUSCULAR; INTRAVENOUS at 04:57

## 2022-07-13 RX ADMIN — Medication SCH ML: at 23:09

## 2022-07-13 RX ADMIN — Medication SCH ML: at 00:09

## 2022-07-13 RX ADMIN — KETOROLAC TROMETHAMINE PRN MG: 30 INJECTION, SOLUTION INTRAMUSCULAR at 18:59

## 2022-07-13 RX ADMIN — HYDRALAZINE HYDROCHLORIDE PRN MG: 20 INJECTION INTRAMUSCULAR; INTRAVENOUS at 04:56

## 2022-07-13 RX ADMIN — CEFTRIAXONE SODIUM SCH MLS/HR: 2 INJECTION, POWDER, FOR SOLUTION INTRAMUSCULAR; INTRAVENOUS at 11:13

## 2022-07-13 RX ADMIN — MORPHINE SULFATE PRN MG: 4 INJECTION, SOLUTION INTRAMUSCULAR; INTRAVENOUS at 11:08

## 2022-07-13 NOTE — PROGRESS NOTE
Assessment and Plan


Assessment and plan: 


77-year-old female with history of hypertension was brought to the emergency 

room because of left hip pain 1 day.  Patient  was getting up from her couch 

and had a mechanical trip and fell to the left hip area.  Patient denies any 

other injuries or trauma.  No loss of consciousness, no headache dizziness, 

chest pain, or palpitations.  In the emergency room CT scan of the lower 

extremity shows acute fracture of the left proximal femur with mild impaction 

and moderate angulation.  Subsequently evaluated by orthopedic surgeon Dr. Valdez.and patient was admitted subsequently underwent closed reduction and IM 

nailing of left femur. Postop care per orthopedics surgeon





--Femur fracture, left


Admit the patient to the medical floor telemetry.  NPO.  D5 half-normal saline 

at the rate of 100 cc per hour.  Morphine 2 mg IV every 4 hours as needed.  

Tylenol 650 mg p.o. every 6 hours as needed.  Reconsult orthopedic Dr. Valdez to

see the patient for possible surgery





--S/P Closed reduction and IM nail placement;


Continue postop care per orthopedic surgeon recommendations


Pain medications, physical therapy and Occupational Therapy and rehab





--hypertension; uncontrolled probably due to pain


Pain management, continue antihypertensives and as needed medications


Hydralazine 10 mg IV every 6 hours as needed.  We continue the home medication





--History of fall


Patient is on fall precaution.  Physical therapy occupational therapy


And rehabilitation





--History of hyponatremia; improving gradually


D5 half-normal saline at the rate of 100 cc/h.  Monitor electrolytes





--Leukocytosis


Rocephin 2 g IV daily.  Recheck CBC in the morning





--Full CODE STATUS;





-- DVT prophylaxis


Heparin 5000 units subcu every 12 hours for DVT prophylaxis.  


Pepcid 20 mg IV every 12 hours for GI prophylaxis.





Continue postop care


Follow-up PT OT evaluation recommendations


Discharge planning per case management


Subacute/SNF placement/home with home health when medically stable





Follow-up with Ortho for recommendations





Plan of care reviewed with patient and his nurse











History


Interval history: 


I have seen and examined the patient at the bedside


Patient with left hip fracture, evaluated by orthopedic surgeon


Patient underwent closed reduction and IM nail placement yesterday 7/12/2020


Patient complains of some pain


Vital signs noted











Hospitalist Physical





- Constitutional


Vitals: 


                                        











Temp Pulse Resp BP Pulse Ox


 


 97.3 F L  94 H  16   197/82   99 


 


 07/13/22 04:43  07/13/22 04:56  07/13/22 04:43  07/13/22 04:56  07/13/22 04:43











General appearance: Present: no acute distress, well-nourished, obese





- EENT


Eyes: Present: PERRL, EOM intact





- Neck


Neck: Present: supple, normal ROM





- Respiratory


Respiratory effort: normal





- Cardiovascular


Rhythm: regular


Heart Sounds: Present: S1 & S2





- Extremities


Extremities: no ischemia, No edema





- Abdominal


General gastrointestinal: soft, non-tender, non-distended, normal bowel sounds





- Integumentary


Integumentary: Present: clear, warm





- Psychiatric


Psychiatric: appropriate mood/affect, cooperative





- Neurologic


Neurologic: moves all extremities





Results





- Labs


CBC & Chem 7: 


                                 07/13/22 05:01





                                 07/12/22 05:32


Labs: 


                             Laboratory Last Values











WBC  14.3 K/mm3 (4.5-11.0)  H  07/12/22  05:32    


 


RBC  3.76 M/mm3 (3.65-5.03)   07/12/22  05:32    


 


Hgb  10.4 gm/dl (10.1-14.3)   07/13/22  05:01    


 


Hct  32.5 % (30.3-42.9)   07/13/22  05:01    


 


MCV  94 fl (79-97)   07/12/22  05:32    


 


MCH  31 pg (28-32)   07/12/22  05:32    


 


MCHC  33 % (30-34)   07/12/22  05:32    


 


RDW  14.4 % (13.2-15.2)   07/12/22  05:32    


 


Plt Count  195 K/mm3 (140-440)   07/12/22  05:32    


 


Lymph % (Auto)  8.8 % (13.4-35.0)  L  07/12/22  05:32    


 


Mono % (Auto)  4.8 % (0.0-7.3)   07/12/22  05:32    


 


Eos % (Auto)  2.7 % (0.0-4.3)   07/12/22  05:32    


 


Baso % (Auto)  0.6 % (0.0-1.8)   07/12/22  05:32    


 


Lymph # (Auto)  1.3 K/mm3 (1.2-5.4)   07/12/22  05:32    


 


Mono # (Auto)  0.7 K/mm3 (0.0-0.8)   07/12/22  05:32    


 


Eos # (Auto)  0.4 K/mm3 (0.0-0.4)   07/12/22  05:32    


 


Baso # (Auto)  0.1 K/mm3 (0.0-0.1)   07/12/22  05:32    


 


Seg Neutrophils %  83.1 % (40.0-70.0)  H  07/12/22  05:32    


 


Seg Neutrophils #  11.9 K/mm3 (1.8-7.7)  H  07/12/22  05:32    


 


Sodium  135 mmol/L (137-145)  L D 07/12/22  05:32    


 


Potassium  4.3 mmol/L (3.6-5.0)   07/12/22  05:32    


 


Chloride  98.4 mmol/L ()   07/12/22  05:32    


 


Carbon Dioxide  22 mmol/L (22-30)   07/12/22  05:32    


 


Anion Gap  19 mmol/L  07/12/22  05:32    


 


BUN  24 mg/dL (7-17)  H  07/12/22  05:32    


 


Creatinine  1.1 mg/dL (0.6-1.2)   07/12/22  05:32    


 


Estimated GFR  58 ml/min  07/12/22  05:32    


 


BUN/Creatinine Ratio  22 %  07/12/22  05:32    


 


Glucose  96 mg/dL ()   07/12/22  05:32    


 


Calcium  9.5 mg/dL (8.4-10.2)   07/12/22  05:32    


 


Total Bilirubin  0.90 mg/dL (0.1-1.2)   07/12/22  05:32    


 


AST  27 units/L (5-40)   07/12/22  05:32    


 


ALT  15 units/L (7-56)   07/12/22  05:32    


 


Alkaline Phosphatase  85 units/L ()   07/12/22  05:32    


 


Total Protein  6.8 g/dL (6.3-8.2)   07/12/22  05:32    


 


Albumin  3.6 g/dL (3.9-5)  L  07/12/22  05:32    


 


Albumin/Globulin Ratio  1.1 %  07/12/22  05:32    











Paul/IV: 


                                        





Voiding Method                   External Female Catheter











Active Medications





- Current Medications


Current Medications: 














Generic Name Dose Route Start Last Admin





  Trade Name Freq  PRN Reason Stop Dose Admin


 


Acetaminophen  650 mg  07/11/22 22:39 





  Acetaminophen 325 Mg Tab  PO  





  Q4H PRN  





  Pain MILD(1-3)/Fever >100.5/HA  


 


Albuterol  2.5 mg  07/11/22 22:39 





  Albuterol 2.5 Mg/3 Ml Nebu  IH  





  Q3HRT PRN  





  Shortness Of Breath  


 


Enoxaparin Sodium  40 mg  07/13/22 10:00  07/13/22 09:52





  Enoxaparin 40 Mg/0.4 Ml Inj  SUB-Q   40 mg





  QDAY LEA   Administration


 


Famotidine  20 mg  07/12/22 10:00  07/13/22 09:52





  Famotidine 20 Mg/2 Ml Inj  IV  07/13/22 12:00  Not Given





  BID [facility]  


 


Famotidine  10 mg  07/13/22 10:00  07/13/22 09:52





  Famotidine 10 Mg Tab  PO   10 mg





  BID LEA   Administration


 


Hydralazine HCl  10 mg  07/12/22 04:17  07/13/22 04:56





  Hydralazine 20 Mg/1 Ml Inj  IV   10 mg





  PRN PRN   Administration





  Hypertension  


 


Dextrose/Sodium Chloride  1,000 mls @ 100 mls/hr  07/11/22 23:00  07/12/22 18:01





  D5/0.45ns  IV   100 mls/hr





  AS DIRECT [facility]   Administration


 


Ceftriaxone Sodium  2 gm in 100 mls @ 200 mls/hr  07/12/22 08:00  07/12/22 12:07





  Rocephin/Ns 2 Gm/100 Ml  IV  07/16/22 08:59  Infused





  Q24H LEA   Infusion





  Protocol  


 


Ibuprofen  800 mg  07/12/22 15:20 





  Ibuprofen 800 Mg Tab  PO  





  Q8H PRN  





  Pain, Moderate (4-6)  


 


Ketorolac Tromethamine  15 mg  07/12/22 15:20 





  Ketorolac 30 Mg/1 Ml Inj  IV  07/17/22 15:19 





  Q6H PRN  





  Pain, Moderate (4-6)  


 


Morphine Sulfate  2 mg  07/12/22 15:20 





  Morphine 2 Mg/1 Ml Inj  IV  





  Q4H PRN  





  Pain, Moderate (4-6)  


 


Morphine Sulfate  4 mg  07/12/22 15:20 





  Morphine 4 Mg/1 Ml Inj  IV  





  Q4H PRN  





  Pain , Severe (7-10)  


 


Ondansetron HCl  4 mg  07/11/22 22:39 





  Ondansetron 4 Mg/2 Ml Inj  IV  





  Q8H PRN  





  Nausea And Vomiting  


 


Sodium Chloride  10 ml  07/12/22 10:00  07/13/22 09:52





  Sodium Chloride 0.9% 10 Ml Flush Syringe  IV   10 ml





  BID [facility]   Administration


 


Sodium Chloride  10 ml  07/11/22 22:39 





  Sodium Chloride 0.9% 10 Ml Flush Syringe  IV  





  PRN PRN  





  LINE FLUSH  


 


Sodium Chloride  10 ml  07/12/22 16:00 





  Sodium Chloride 0.9% 10 Ml Flush Syringe  IV  07/22/22 23:59 





  PRN NR

## 2022-07-13 NOTE — EVENT NOTE
Date: 07/13/22


I called , at [facility] representative called and said she would 

connect me with Dr. Gomez, however she informed me that the doctor is busy on 

the other line and she would call me back again tomorrow morning to discuss 

about the patient.


We will try to contact [facility] physician tomorrow and update the patient's 

condition and the treatment and discharge planning


I informed the patient's nurse.

## 2022-07-13 NOTE — PROGRESS NOTE
Assessment and Plan





s/p IM nail left hip/femur


doing well, continue PT and observation





Subjective


Date of service: 07/13/22


Interval history: 





c/o incisional pain left hip otherwise ok, PT started....





Objective


Vital signs: 


                               Vital Signs - 12hr











  07/13/22 07/13/22 07/13/22





  04:43 04:56 12:35


 


Temperature 97.3 F L  


 


Pulse Rate 94 H 94 H 


 


Respiratory 16  





Rate   


 


Blood Pressure 197/82 197/82 


 


O2 Sat by Pulse 99  99





Oximetry   














  07/13/22





  12:38


 


Temperature 


 


Pulse Rate 


 


Respiratory 





Rate 


 


Blood Pressure 


 


O2 Sat by Pulse 97





Oximetry 











Incision: clean and dry


Weight bearing status: as tolerated





- Labs


CBC & BMP: 


                                 07/13/22 05:01





                                 07/12/22 05:32

## 2022-07-14 RX ADMIN — Medication SCH ML: at 21:44

## 2022-07-14 RX ADMIN — ENOXAPARIN SODIUM SCH MG: 100 INJECTION SUBCUTANEOUS at 09:21

## 2022-07-14 RX ADMIN — FAMOTIDINE SCH MG: 10 TABLET ORAL at 21:44

## 2022-07-14 RX ADMIN — MORPHINE SULFATE PRN MG: 4 INJECTION, SOLUTION INTRAMUSCULAR; INTRAVENOUS at 19:39

## 2022-07-14 RX ADMIN — MORPHINE SULFATE PRN MG: 4 INJECTION, SOLUTION INTRAMUSCULAR; INTRAVENOUS at 10:20

## 2022-07-14 RX ADMIN — FAMOTIDINE SCH MG: 10 TABLET ORAL at 09:21

## 2022-07-14 RX ADMIN — CEFTRIAXONE SODIUM SCH MLS/HR: 2 INJECTION, POWDER, FOR SOLUTION INTRAMUSCULAR; INTRAVENOUS at 09:21

## 2022-07-14 RX ADMIN — Medication SCH ML: at 09:22

## 2022-07-14 RX ADMIN — DEXTROSE AND SODIUM CHLORIDE SCH MLS/HR: 5; .45 INJECTION, SOLUTION INTRAVENOUS at 05:07

## 2022-07-14 RX ADMIN — MORPHINE SULFATE PRN MG: 4 INJECTION, SOLUTION INTRAMUSCULAR; INTRAVENOUS at 01:50

## 2022-07-14 NOTE — EVENT NOTE
Date: 07/14/22


I called [facility] at [phone] and discussed with [facility] physician Dr. Canseco and 

discussed in detail


Patient's condition, diagnosis, treatment of IM nailing and reduction of left 

hip fracture, and PT evaluation and recommendations of


Subacute rehab placement, I also informed that patient is stable to be 

discharged to subacute rehab.


She reported that [facility] case management will get in touch with her patient's 

 and make arrangements for transfer to subacute rehab.


I informed the nurse and the CM about the [facility] physicians discharge plans.

## 2022-07-14 NOTE — PROGRESS NOTE
Assessment and Plan


Assessment and plan: 


77-year-old female with history of hypertension was brought to the emergency 

room because of left hip pain 1 day.  Patient  was getting up from her couch 

and had a mechanical trip and fell to the left hip area.  Patient denies any 

other injuries or trauma.  No loss of consciousness, no headache dizziness, 

chest pain, or palpitations.  In the emergency room CT scan of the lower 

extremity shows acute fracture of the left proximal femur with mild impaction 

and moderate angulation.  Subsequently evaluated by orthopedic surgeon Dr. Valdez.and patient was admitted subsequently underwent closed reduction and IM 

nailing of left femur. Postop care per orthopedics surgeon





--Femur fracture, left


Admit the patient to the medical floor telemetry.  NPO.  D5 half-normal saline 

at the rate of 100 cc per hour.  Morphine 2 mg IV every 4 hours as needed.  

Tylenol 650 mg p.o. every 6 hours as needed.  Reconsult orthopedic Dr. Valdez to

see the patient for possible surgery





--S/P Closed reduction and IM nail placement;


Continue postop care per orthopedic surgeon recommendations


Pain medications, physical therapy and Occupational Therapy and rehab





--hypertension; uncontrolled probably due to pain


Pain management, continue antihypertensives and as needed medications


Hydralazine 10 mg IV every 6 hours as needed.  We continue the home medication





--History of fall


Patient is on fall precaution.  Physical therapy occupational therapy


And rehabilitation





--History of hyponatremia; improving gradually


D5 half-normal saline at the rate of 100 cc/h.  Monitor electrolytes





--Leukocytosis


Rocephin 2 g IV daily.  Recheck CBC in the morning





--Full CODE STATUS;





-- DVT prophylaxis


Heparin 5000 units subcu every 12 hours for DVT prophylaxis.  


Pepcid 20 mg IV every 12 hours for GI prophylaxis.





Continue postop care


Follow-up PT OT evaluation recommendations


Discharge planning per case management


Subacute/SNF placement/home with home health when medically stable


Follow-up with Ortho for recommendations





Patient is clinically stable for discharge


Awaiting subacute rehab placement per PT





Will try to contact [facility] physicians this afternoon


In process planning discharge to subacute rehab





Plan of care reviewed with patient and his nurse





7/13/2022;


I called , at [facility] representative called and said she would co

nnect me with Dr. Gomez, however she informed me that the doctor is busy on 

the other line and she would call me back again tomorrow morning to discuss 

about the patient.We will try to contact [facility] physician tomorrow 





7/14/2022;.  We will try to contact [facility] this afternoon to discuss 

the discharge planning











History


Interval history: 


I have seen and examined the patient at the bedside this afternoon


Patient's chart and medications reviewed


Patient feels better tolerating physical therapy


Complains of some mild pain at the surgical site


Vital signs reviewed








Hospitalist Physical





- Constitutional


Vitals: 


                                        











Temp Pulse Resp BP Pulse Ox


 


 98.0 F   96 H  22   150/73   99 


 


 07/14/22 12:29  07/14/22 12:29  07/14/22 12:29  07/14/22 12:29  07/14/22 12:29











General appearance: Present: no acute distress, well-nourished, obese





- EENT


Eyes: Present: PERRL, EOM intact





- Neck


Neck: Present: supple, normal ROM





- Respiratory


Respiratory effort: normal


Respiratory: bilateral: diminished, negative: rales, rhonchi, wheezing





- Cardiovascular


Rhythm: regular


Heart Sounds: Present: S1 & S2





- Extremities


Extremities: no ischemia, No edema





- Abdominal


General gastrointestinal: soft, non-tender, non-distended, normal bowel sounds





- Integumentary


Integumentary: Present: clear, warm





- Psychiatric


Psychiatric: appropriate mood/affect, cooperative





- Neurologic


Neurologic: CNII-XII intact, moves all extremities





Results





- Labs


CBC & Chem 7: 


                                 07/13/22 05:01





                                 07/14/22 05:17


Labs: 


                             Laboratory Last Values











WBC  14.3 K/mm3 (4.5-11.0)  H  07/12/22  05:32    


 


RBC  3.76 M/mm3 (3.65-5.03)   07/12/22  05:32    


 


Hgb  10.4 gm/dl (10.1-14.3)   07/13/22  05:01    


 


Hct  32.5 % (30.3-42.9)   07/13/22  05:01    


 


MCV  94 fl (79-97)   07/12/22  05:32    


 


MCH  31 pg (28-32)   07/12/22  05:32    


 


MCHC  33 % (30-34)   07/12/22  05:32    


 


RDW  14.4 % (13.2-15.2)   07/12/22  05:32    


 


Plt Count  195 K/mm3 (140-440)   07/12/22  05:32    


 


Lymph % (Auto)  8.8 % (13.4-35.0)  L  07/12/22  05:32    


 


Mono % (Auto)  4.8 % (0.0-7.3)   07/12/22  05:32    


 


Eos % (Auto)  2.7 % (0.0-4.3)   07/12/22  05:32    


 


Baso % (Auto)  0.6 % (0.0-1.8)   07/12/22  05:32    


 


Lymph # (Auto)  1.3 K/mm3 (1.2-5.4)   07/12/22  05:32    


 


Mono # (Auto)  0.7 K/mm3 (0.0-0.8)   07/12/22  05:32    


 


Eos # (Auto)  0.4 K/mm3 (0.0-0.4)   07/12/22  05:32    


 


Baso # (Auto)  0.1 K/mm3 (0.0-0.1)   07/12/22  05:32    


 


Seg Neutrophils %  83.1 % (40.0-70.0)  H  07/12/22  05:32    


 


Seg Neutrophils #  11.9 K/mm3 (1.8-7.7)  H  07/12/22  05:32    


 


Sodium  132 mmol/L (137-145)  L  07/14/22  05:17    


 


Potassium  4.3 mmol/L (3.6-5.0)   07/12/22  05:32    


 


Chloride  98.4 mmol/L ()   07/12/22  05:32    


 


Carbon Dioxide  22 mmol/L (22-30)   07/12/22  05:32    


 


Anion Gap  19 mmol/L  07/12/22  05:32    


 


BUN  24 mg/dL (7-17)  H  07/12/22  05:32    


 


Creatinine  1.1 mg/dL (0.6-1.2)   07/12/22  05:32    


 


Estimated GFR  58 ml/min  07/12/22  05:32    


 


BUN/Creatinine Ratio  22 %  07/12/22  05:32    


 


Glucose  96 mg/dL ()   07/12/22  05:32    


 


Calcium  9.5 mg/dL (8.4-10.2)   07/12/22  05:32    


 


Total Bilirubin  0.90 mg/dL (0.1-1.2)   07/12/22  05:32    


 


AST  27 units/L (5-40)   07/12/22  05:32    


 


ALT  15 units/L (7-56)   07/12/22  05:32    


 


Alkaline Phosphatase  85 units/L ()   07/12/22  05:32    


 


Total Protein  6.8 g/dL (6.3-8.2)   07/12/22  05:32    


 


Albumin  3.6 g/dL (3.9-5)  L  07/12/22  05:32    


 


Albumin/Globulin Ratio  1.1 %  07/12/22  05:32    











Paul/IV: 


                                        





Voiding Method                   External Female Catheter











Active Medications





- Current Medications


Current Medications: 














Generic Name Dose Route Start Last Admin





  Trade Name Freq  PRN Reason Stop Dose Admin


 


Acetaminophen  650 mg  07/11/22 22:39 





  Acetaminophen 325 Mg Tab  PO  





  Q4H PRN  





  Pain MILD(1-3)/Fever >100.5/HA  


 


Albuterol  2.5 mg  07/11/22 22:39 





  Albuterol 2.5 Mg/3 Ml Nebu  IH  





  Q3HRT PRN  





  Shortness Of Breath  


 


Enoxaparin Sodium  40 mg  07/13/22 10:00  07/14/22 09:21





  Enoxaparin 40 Mg/0.4 Ml Inj  SUB-Q   40 mg





  QDAY LEA   Administration


 


Famotidine  10 mg  07/13/22 10:00  07/14/22 09:21





  Famotidine 10 Mg Tab  PO   10 mg





  BID LEA   Administration


 


Hydralazine HCl  10 mg  07/12/22 04:17  07/13/22 04:56





  Hydralazine 20 Mg/1 Ml Inj  IV   10 mg





  PRN PRN   Administration





  Hypertension  


 


Dextrose/Sodium Chloride  1,000 mls @ 100 mls/hr  07/11/22 23:00  07/14/22 05:07





  D5/0.45ns  IV   100 mls/hr





  AS DIRECT LEA   Administration


 


Ceftriaxone Sodium  2 gm in 100 mls @ 200 mls/hr  07/12/22 08:00  07/14/22 09:21





  Rocephin/Ns 2 Gm/100 Ml  IV  07/16/22 08:59  100 mls/hr





  Q24H LEA   Administration





  Protocol  


 


Ibuprofen  800 mg  07/12/22 15:20 





  Ibuprofen 800 Mg Tab  PO  





  Q8H PRN  





  Pain, Moderate (4-6)  


 


Ketorolac Tromethamine  15 mg  07/12/22 15:20  07/13/22 18:59





  Ketorolac 30 Mg/1 Ml Inj  IV  07/17/22 15:19  15 mg





  Q6H PRN   Administration





  Pain, Moderate (4-6)  


 


Morphine Sulfate  2 mg  07/12/22 15:20 





  Morphine 2 Mg/1 Ml Inj  IV  





  Q4H PRN  





  Pain, Moderate (4-6)  


 


Morphine Sulfate  4 mg  07/12/22 15:20  07/14/22 10:20





  Morphine 4 Mg/1 Ml Inj  IV   4 mg





  Q4H PRN   Administration





  Pain , Severe (7-10)  


 


Ondansetron HCl  4 mg  07/11/22 22:39 





  Ondansetron 4 Mg/2 Ml Inj  IV  





  Q8H PRN  





  Nausea And Vomiting  


 


Sodium Chloride  10 ml  07/12/22 10:00  07/14/22 09:22





  Sodium Chloride 0.9% 10 Ml Flush Syringe  IV   10 ml





  BID LEA   Administration


 


Sodium Chloride  10 ml  07/11/22 22:39 





  Sodium Chloride 0.9% 10 Ml Flush Syringe  IV  





  PRN PRN  





  LINE FLUSH  


 


Sodium Chloride  10 ml  07/12/22 16:00 





  Sodium Chloride 0.9% 10 Ml Flush Syringe  IV  07/22/22 23:59 





  PRN NR

## 2022-07-15 RX ADMIN — CEFTRIAXONE SODIUM SCH MLS/HR: 2 INJECTION, POWDER, FOR SOLUTION INTRAMUSCULAR; INTRAVENOUS at 12:19

## 2022-07-15 RX ADMIN — ENOXAPARIN SODIUM SCH MG: 100 INJECTION SUBCUTANEOUS at 11:06

## 2022-07-15 RX ADMIN — Medication SCH ML: at 22:42

## 2022-07-15 RX ADMIN — FAMOTIDINE SCH MG: 10 TABLET ORAL at 23:07

## 2022-07-15 RX ADMIN — Medication SCH: at 11:08

## 2022-07-15 RX ADMIN — FAMOTIDINE SCH MG: 10 TABLET ORAL at 11:09

## 2022-07-15 NOTE — PROGRESS NOTE
Assessment and Plan


Assessment and plan: 





77-year-old female with history of hypertension was brought to the emergency 

room because of left hip pain 1 day.  Patient  was getting up from her couch 

and had a mechanical trip and fell to the left hip area.  Patient denies any 

other injuries or trauma.  No loss of consciousness, no headache dizziness, 

chest pain, or palpitations.  In the emergency room CT scan of the lower 

extremity shows acute fracture of the left proximal femur with mild impaction 

and moderate angulation.  Subsequently evaluated by orthopedic surgeon Dr. Valdez.and patient was admitted subsequently underwent closed reduction and IM 

nailing of left femur. Postop care per orthopedics surgeon





--Femur fracture, left


Admit the patient to the medical floor telemetry.  NPO.  D5 half-normal saline 

at the rate of 100 cc per hour.  Morphine 2 mg IV every 4 hours as needed.  

Tylenol 650 mg p.o. every 6 hours as needed.  Reconsult orthopedic Dr. Valdez to

see the patient for possible surgery





--S/P Closed reduction and IM nail placement;


Continue postop care per orthopedic surgeon recommendations


Pain medications, physical therapy and Occupational Therapy and rehab





--hypertension; uncontrolled probably due to pain


Pain management, continue antihypertensives and as needed medications


Hydralazine 10 mg IV every 6 hours as needed.  We continue the home medication





--History of fall


Patient is on fall precaution.  Physical therapy occupational therapy


And rehabilitation





--History of hyponatremia; improving gradually


D5 half-normal saline at the rate of 100 cc/h.  Monitor electrolytes





--Leukocytosis


Rocephin 2 g IV daily.  Recheck CBC in the morning





--Full CODE STATUS;





-- DVT prophylaxis


Heparin 5000 units subcu every 12 hours for DVT prophylaxis.  


Pepcid 20 mg IV every 12 hours for GI prophylaxis.





Continue postop care


Follow-up PT OT evaluation recommendations


Discharge planning per case management


Subacute/SNF placement/home with home health when medically stable


Follow-up with Ortho for recommendations





Patient is clinically stable for discharge


Awaiting subacute rehab placement per PT





Will try to contact [facility] physicians this afternoon


In process planning discharge to subacute rehab





Plan of care reviewed with patient and his nurse





7/13/2022;


I called , at [facility] representative called and said she would c

onnect me with Dr. Gomez, however she informed me that the doctor is busy on 

the other line and she would call me back again tomorrow morning to discuss 

about the patient.We will try to contact [facility] physician tomorrow 





7/14/2022;.  We will try to contact [facility] physicians this afternoon to discuss 

the discharge planning





7/15/22; [facility]  and our  processing subacute rehab 

placement


Pending authorization











Hospitalist Physical





- Constitutional


Vitals: 


                                        











Temp Pulse Resp BP Pulse Ox


 


 98.5 F   103 H  18   158/76   97 


 


 07/15/22 05:19  07/15/22 05:19  07/15/22 12:00  07/15/22 05:19  07/15/22 12:00











General appearance: Present: no acute distress, well-nourished, obese





Results





- Labs


CBC & Chem 7: 


                                 07/13/22 05:01





                                 07/14/22 05:17


Labs: 


                             Laboratory Last Values











WBC  14.3 K/mm3 (4.5-11.0)  H  07/12/22  05:32    


 


RBC  3.76 M/mm3 (3.65-5.03)   07/12/22  05:32    


 


Hgb  10.4 gm/dl (10.1-14.3)   07/13/22  05:01    


 


Hct  32.5 % (30.3-42.9)   07/13/22  05:01    


 


MCV  94 fl (79-97)   07/12/22  05:32    


 


MCH  31 pg (28-32)   07/12/22  05:32    


 


MCHC  33 % (30-34)   07/12/22  05:32    


 


RDW  14.4 % (13.2-15.2)   07/12/22  05:32    


 


Plt Count  195 K/mm3 (140-440)   07/12/22  05:32    


 


Lymph % (Auto)  8.8 % (13.4-35.0)  L  07/12/22  05:32    


 


Mono % (Auto)  4.8 % (0.0-7.3)   07/12/22  05:32    


 


Eos % (Auto)  2.7 % (0.0-4.3)   07/12/22  05:32    


 


Baso % (Auto)  0.6 % (0.0-1.8)   07/12/22  05:32    


 


Lymph # (Auto)  1.3 K/mm3 (1.2-5.4)   07/12/22  05:32    


 


Mono # (Auto)  0.7 K/mm3 (0.0-0.8)   07/12/22  05:32    


 


Eos # (Auto)  0.4 K/mm3 (0.0-0.4)   07/12/22  05:32    


 


Baso # (Auto)  0.1 K/mm3 (0.0-0.1)   07/12/22  05:32    


 


Seg Neutrophils %  83.1 % (40.0-70.0)  H  07/12/22  05:32    


 


Seg Neutrophils #  11.9 K/mm3 (1.8-7.7)  H  07/12/22  05:32    


 


Sodium  132 mmol/L (137-145)  L  07/14/22  05:17    


 


Potassium  4.3 mmol/L (3.6-5.0)   07/12/22  05:32    


 


Chloride  98.4 mmol/L ()   07/12/22  05:32    


 


Carbon Dioxide  22 mmol/L (22-30)   07/12/22  05:32    


 


Anion Gap  19 mmol/L  07/12/22  05:32    


 


BUN  24 mg/dL (7-17)  H  07/12/22  05:32    


 


Creatinine  1.1 mg/dL (0.6-1.2)   07/12/22  05:32    


 


Estimated GFR  58 ml/min  07/12/22  05:32    


 


BUN/Creatinine Ratio  22 %  07/12/22  05:32    


 


Glucose  96 mg/dL ()   07/12/22  05:32    


 


Calcium  9.5 mg/dL (8.4-10.2)   07/12/22  05:32    


 


Total Bilirubin  0.90 mg/dL (0.1-1.2)   07/12/22  05:32    


 


AST  27 units/L (5-40)   07/12/22  05:32    


 


ALT  15 units/L (7-56)   07/12/22  05:32    


 


Alkaline Phosphatase  85 units/L ()   07/12/22  05:32    


 


Total Protein  6.8 g/dL (6.3-8.2)   07/12/22  05:32    


 


Albumin  3.6 g/dL (3.9-5)  L  07/12/22  05:32    


 


Albumin/Globulin Ratio  1.1 %  07/12/22  05:32    











Paul/IV: 


                                        





Voiding Method                   External Female Catheter











Active Medications





- Current Medications


Current Medications: 














Generic Name Dose Route Start Last Admin





  Trade Name Freq  PRN Reason Stop Dose Admin


 


Acetaminophen  650 mg  07/11/22 22:39 





  Acetaminophen 325 Mg Tab  PO  





  Q4H PRN  





  Pain MILD(1-3)/Fever >100.5/HA  


 


Albuterol  2.5 mg  07/11/22 22:39 





  Albuterol 2.5 Mg/3 Ml Nebu  IH  





  Q3HRT PRN  





  Shortness Of Breath  


 


Enoxaparin Sodium  40 mg  07/13/22 10:00  07/15/22 11:06





  Enoxaparin 40 Mg/0.4 Ml Inj  SUB-Q   40 mg





  QDAY LEA   Administration


 


Famotidine  10 mg  07/13/22 10:00  07/15/22 11:09





  Famotidine 10 Mg Tab  PO   10 mg





  BID LEA   Administration


 


Hydralazine HCl  10 mg  07/12/22 04:17  07/13/22 04:56





  Hydralazine 20 Mg/1 Ml Inj  IV   10 mg





  PRN PRN   Administration





  Hypertension  


 


Dextrose/Sodium Chloride  1,000 mls @ 100 mls/hr  07/11/22 23:00  07/14/22 05:07





  D5/0.45ns  IV   100 mls/hr





  AS DIRECT LEA   Administration


 


Ceftriaxone Sodium  2 gm in 100 mls @ 200 mls/hr  07/12/22 08:00  07/15/22 12:19





  Rocephin/Ns 2 Gm/100 Ml  IV  07/16/22 08:59  100 mls/hr





  Q24H LEA   Administration





  Protocol  


 


Ibuprofen  800 mg  07/12/22 15:20 





  Ibuprofen 800 Mg Tab  PO  





  Q8H PRN  





  Pain, Moderate (4-6)  


 


Ketorolac Tromethamine  15 mg  07/12/22 15:20  07/13/22 18:59





  Ketorolac 30 Mg/1 Ml Inj  IV  07/17/22 15:19  15 mg





  Q6H PRN   Administration





  Pain, Moderate (4-6)  


 


Morphine Sulfate  2 mg  07/12/22 15:20 





  Morphine 2 Mg/1 Ml Inj  IV  





  Q4H PRN  





  Pain, Moderate (4-6)  


 


Morphine Sulfate  4 mg  07/12/22 15:20  07/14/22 19:39





  Morphine 4 Mg/1 Ml Inj  IV   4 mg





  Q4H PRN   Administration





  Pain , Severe (7-10)  


 


Ondansetron HCl  4 mg  07/11/22 22:39 





  Ondansetron 4 Mg/2 Ml Inj  IV  





  Q8H PRN  





  Nausea And Vomiting  


 


Sodium Chloride  10 ml  07/12/22 10:00  07/15/22 11:08





  Sodium Chloride 0.9% 10 Ml Flush Syringe  IV   Not Given





  BID LEA  


 


Sodium Chloride  10 ml  07/11/22 22:39 





  Sodium Chloride 0.9% 10 Ml Flush Syringe  IV  





  PRN PRN  





  LINE FLUSH  


 


Sodium Chloride  10 ml  07/12/22 16:00 





  Sodium Chloride 0.9% 10 Ml Flush Syringe  IV  07/22/22 23:59 





  PRN NR

## 2022-07-15 NOTE — PROGRESS NOTE
Assessment and Plan





s/p IM nail left hip/femur


doing well, continue PT and observation





Subjective


Date of service: 07/15/22


Interval history: 





appears more confused today...





Objective


Vital signs: 


                               Vital Signs - 12hr











  07/15/22





  05:19


 


Temperature 98.5 F


 


Pulse Rate 103 H


 


Respiratory 24





Rate 


 


Blood Pressure 158/76


 


O2 Sat by Pulse 91





Oximetry 











Incision: clean and dry


Weight bearing status: as tolerated





- Labs


CBC & BMP: 


                                 07/13/22 05:01





                                 07/14/22 05:17

## 2022-07-15 NOTE — PROGRESS NOTE
Assessment and Plan


Assessment and plan: 


I called [facility] at  and discussed with [facility] physician Dr. Canseco and

discussed in detail


Patient's condition, diagnosis, treatment of IM nailing and reduction of left 

hip fracture, and PT evaluation and recommendations of


Subacute rehab placement, I also informed that patient is stable to be 

discharged to subacute rehab.


She reported that [facility] case management will get in touch with her patient's 

 and make arrangements for transfer to subacute rehab.


I informed the nurse and the CM about the [facility] physicians discharge plans.








77-year-old female with history of hypertension was brought to the emergency 

room because of left hip pain 1 day.  Patient  was getting up from her couch 

and had a mechanical trip and fell to the left hip area.  Patient denies any 

other injuries or trauma.  No loss of consciousness, no headache dizziness, c

hest pain, or palpitations.  In the emergency room CT scan of the lower 

extremity shows acute fracture of the left proximal femur with mild impaction 

and moderate angulation.  Subsequently evaluated by orthopedic surgeon Dr. Valdez.and patient was admitted subsequently underwent closed reduction and IM 

nailing of left femur. Postop care per orthopedics surgeon





--Femur fracture, left


Admit the patient to the medical floor telemetry.  NPO.  D5 half-normal saline 

at the rate of 100 cc per hour.  Morphine 2 mg IV every 4 hours as needed.  

Tylenol 650 mg p.o. every 6 hours as needed.  Reconsult orthopedic Dr. Valdez to

see the patient for possible surgery





--S/P Closed reduction and IM nail placement;


Continue postop care per orthopedic surgeon recommendations


Pain medications, physical therapy and Occupational Therapy and rehab





--hypertension; uncontrolled probably due to pain


Pain management, continue antihypertensives and as needed medications


Hydralazine 10 mg IV every 6 hours as needed.  We continue the home medication





--History of fall


Patient is on fall precaution.  Physical therapy occupational therapy


And rehabilitation





--History of hyponatremia; improving gradually


D5 half-normal saline at the rate of 100 cc/h.  Monitor electrolytes





--Leukocytosis


Rocephin 2 g IV daily.  Recheck CBC in the morning





--Full CODE STATUS;





-- DVT prophylaxis


Heparin 5000 units subcu every 12 hours for DVT prophylaxis.  


Pepcid 20 mg IV every 12 hours for GI prophylaxis.





Continue postop care


Follow-up PT OT evaluation recommendations


Discharge planning per case management


Subacute/SNF placement/home with home health when medically stable


Follow-up with Ortho for recommendations





Patient is clinically stable for discharge


Awaiting subacute rehab placement per PT





Will try to contact [facility] physicians this afternoon


In process planning discharge to subacute rehab





Plan of care reviewed with patient and his nurse





7/13/2022;


I called , at [facility] representative called and said she would connec

t me with Dr. Gomez, however she informed me that the doctor is busy on the 

other line and she would call me back again tomorrow morning to discuss about 

the patient.We will try to contact [facility] physician tomorrow 





7/14/2022;.  We will try to contact [facility] physicians this afternoon to discuss 

the discharge planning





7/15/22; [facility]  and our  processing subacute rehab p

kwadwo


Pending authorization














History


Interval history: 


Seen and examined the patient at bedside


Patient's chart and medications reviewed


Complains of some pain


Tolerating physical therapy


Vital signs noted





Pending subacute rehab placement


[facility] Case management assisting with discharge planning








Hospitalist Physical





- Constitutional


Vitals: 


                                        











Temp Pulse Resp BP Pulse Ox


 


 98.5 F   103 H  18   158/76   97 


 


 07/15/22 05:19  07/15/22 05:19  07/15/22 12:00  07/15/22 05:19  07/15/22 12:00











General appearance: Present: no acute distress, well-nourished, obese





- EENT


Eyes: Present: PERRL, EOM intact





- Respiratory


Respiratory effort: normal, labored


Respiratory: bilateral: diminished, negative: rales, rhonchi, wheezing





- Cardiovascular


Rhythm: regular


Heart Sounds: Present: S1 & S2





- Extremities


Extremities: no ischemia, No edema





- Abdominal


General gastrointestinal: soft, non-tender, non-distended, normal bowel sounds





- Integumentary


Integumentary: Present: clear, warm





- Psychiatric


Psychiatric: appropriate mood/affect, cooperative





- Neurologic


Neurologic: moves all extremities





Results





- Labs


CBC & Chem 7: 


                                 07/13/22 05:01





                                 07/14/22 05:17


Labs: 


                             Laboratory Last Values











WBC  14.3 K/mm3 (4.5-11.0)  H  07/12/22  05:32    


 


RBC  3.76 M/mm3 (3.65-5.03)   07/12/22  05:32    


 


Hgb  10.4 gm/dl (10.1-14.3)   07/13/22  05:01    


 


Hct  32.5 % (30.3-42.9)   07/13/22  05:01    


 


MCV  94 fl (79-97)   07/12/22  05:32    


 


MCH  31 pg (28-32)   07/12/22  05:32    


 


MCHC  33 % (30-34)   07/12/22  05:32    


 


RDW  14.4 % (13.2-15.2)   07/12/22  05:32    


 


Plt Count  195 K/mm3 (140-440)   07/12/22  05:32    


 


Lymph % (Auto)  8.8 % (13.4-35.0)  L  07/12/22  05:32    


 


Mono % (Auto)  4.8 % (0.0-7.3)   07/12/22  05:32    


 


Eos % (Auto)  2.7 % (0.0-4.3)   07/12/22  05:32    


 


Baso % (Auto)  0.6 % (0.0-1.8)   07/12/22  05:32    


 


Lymph # (Auto)  1.3 K/mm3 (1.2-5.4)   07/12/22  05:32    


 


Mono # (Auto)  0.7 K/mm3 (0.0-0.8)   07/12/22  05:32    


 


Eos # (Auto)  0.4 K/mm3 (0.0-0.4)   07/12/22  05:32    


 


Baso # (Auto)  0.1 K/mm3 (0.0-0.1)   07/12/22  05:32    


 


Seg Neutrophils %  83.1 % (40.0-70.0)  H  07/12/22  05:32    


 


Seg Neutrophils #  11.9 K/mm3 (1.8-7.7)  H  07/12/22  05:32    


 


Sodium  132 mmol/L (137-145)  L  07/14/22  05:17    


 


Potassium  4.3 mmol/L (3.6-5.0)   07/12/22  05:32    


 


Chloride  98.4 mmol/L ()   07/12/22  05:32    


 


Carbon Dioxide  22 mmol/L (22-30)   07/12/22  05:32    


 


Anion Gap  19 mmol/L  07/12/22  05:32    


 


BUN  24 mg/dL (7-17)  H  07/12/22  05:32    


 


Creatinine  1.1 mg/dL (0.6-1.2)   07/12/22  05:32    


 


Estimated GFR  58 ml/min  07/12/22  05:32    


 


BUN/Creatinine Ratio  22 %  07/12/22  05:32    


 


Glucose  96 mg/dL ()   07/12/22  05:32    


 


Calcium  9.5 mg/dL (8.4-10.2)   07/12/22  05:32    


 


Total Bilirubin  0.90 mg/dL (0.1-1.2)   07/12/22  05:32    


 


AST  27 units/L (5-40)   07/12/22  05:32    


 


ALT  15 units/L (7-56)   07/12/22  05:32    


 


Alkaline Phosphatase  85 units/L ()   07/12/22  05:32    


 


Total Protein  6.8 g/dL (6.3-8.2)   07/12/22  05:32    


 


Albumin  3.6 g/dL (3.9-5)  L  07/12/22  05:32    


 


Albumin/Globulin Ratio  1.1 %  07/12/22  05:32    











Paul/IV: 


                                        





Voiding Method                   External Female Catheter











Active Medications





- Current Medications


Current Medications: 














Generic Name Dose Route Start Last Admin





  Trade Name Freq  PRN Reason Stop Dose Admin


 


Acetaminophen  650 mg  07/11/22 22:39 





  Acetaminophen 325 Mg Tab  PO  





  Q4H PRN  





  Pain MILD(1-3)/Fever >100.5/HA  


 


Albuterol  2.5 mg  07/11/22 22:39 





  Albuterol 2.5 Mg/3 Ml Nebu  IH  





  Q3HRT PRN  





  Shortness Of Breath  


 


Enoxaparin Sodium  40 mg  07/13/22 10:00  07/15/22 11:06





  Enoxaparin 40 Mg/0.4 Ml Inj  SUB-Q   40 mg





  QDAY LEA   Administration


 


Famotidine  10 mg  07/13/22 10:00  07/15/22 11:09





  Famotidine 10 Mg Tab  PO   10 mg





  BID LEA   Administration


 


Hydralazine HCl  10 mg  07/12/22 04:17  07/13/22 04:56





  Hydralazine 20 Mg/1 Ml Inj  IV   10 mg





  PRN PRN   Administration





  Hypertension  


 


Dextrose/Sodium Chloride  1,000 mls @ 100 mls/hr  07/11/22 23:00  07/14/22 05:07





  D5/0.45ns  IV   100 mls/hr





  AS DIRECT LEA   Administration


 


Ceftriaxone Sodium  2 gm in 100 mls @ 200 mls/hr  07/12/22 08:00  07/15/22 12:19





  Rocephin/Ns 2 Gm/100 Ml  IV  07/16/22 08:59  100 mls/hr





  Q24H LEA   Administration





  Protocol  


 


Ibuprofen  800 mg  07/12/22 15:20 





  Ibuprofen 800 Mg Tab  PO  





  Q8H PRN  





  Pain, Moderate (4-6)  


 


Ketorolac Tromethamine  15 mg  07/12/22 15:20  07/13/22 18:59





  Ketorolac 30 Mg/1 Ml Inj  IV  07/17/22 15:19  15 mg





  Q6H PRN   Administration





  Pain, Moderate (4-6)  


 


Morphine Sulfate  2 mg  07/12/22 15:20 





  Morphine 2 Mg/1 Ml Inj  IV  





  Q4H PRN  





  Pain, Moderate (4-6)  


 


Morphine Sulfate  4 mg  07/12/22 15:20  07/14/22 19:39





  Morphine 4 Mg/1 Ml Inj  IV   4 mg





  Q4H PRN   Administration





  Pain , Severe (7-10)  


 


Ondansetron HCl  4 mg  07/11/22 22:39 





  Ondansetron 4 Mg/2 Ml Inj  IV  





  Q8H PRN  





  Nausea And Vomiting  


 


Sodium Chloride  10 ml  07/12/22 10:00  07/15/22 11:08





  Sodium Chloride 0.9% 10 Ml Flush Syringe  IV   Not Given





  BID LEA  


 


Sodium Chloride  10 ml  07/11/22 22:39 





  Sodium Chloride 0.9% 10 Ml Flush Syringe  IV  





  PRN PRN  





  LINE FLUSH  


 


Sodium Chloride  10 ml  07/12/22 16:00 





  Sodium Chloride 0.9% 10 Ml Flush Syringe  IV  07/22/22 23:59 





  PRN NR

## 2022-07-16 RX ADMIN — Medication SCH ML: at 10:18

## 2022-07-16 RX ADMIN — CEFTRIAXONE SODIUM SCH MLS/HR: 2 INJECTION, POWDER, FOR SOLUTION INTRAMUSCULAR; INTRAVENOUS at 10:16

## 2022-07-16 RX ADMIN — Medication SCH ML: at 21:58

## 2022-07-16 RX ADMIN — FAMOTIDINE SCH MG: 10 TABLET ORAL at 21:58

## 2022-07-16 RX ADMIN — KETOROLAC TROMETHAMINE PRN MG: 30 INJECTION, SOLUTION INTRAMUSCULAR at 22:06

## 2022-07-16 RX ADMIN — FAMOTIDINE SCH MG: 10 TABLET ORAL at 10:16

## 2022-07-16 RX ADMIN — ENOXAPARIN SODIUM SCH MG: 100 INJECTION SUBCUTANEOUS at 10:16

## 2022-07-17 RX ADMIN — FAMOTIDINE SCH MG: 10 TABLET ORAL at 21:18

## 2022-07-17 RX ADMIN — ENOXAPARIN SODIUM SCH MG: 100 INJECTION SUBCUTANEOUS at 09:20

## 2022-07-17 RX ADMIN — Medication SCH ML: at 09:20

## 2022-07-17 RX ADMIN — MORPHINE SULFATE PRN MG: 2 INJECTION, SOLUTION INTRAMUSCULAR; INTRAVENOUS at 21:15

## 2022-07-17 RX ADMIN — Medication SCH ML: at 21:18

## 2022-07-17 RX ADMIN — FAMOTIDINE SCH MG: 10 TABLET ORAL at 09:20

## 2022-07-17 RX ADMIN — CETIRIZINE HYDROCHLORIDE SCH MG: 10 TABLET, FILM COATED ORAL at 09:20

## 2022-07-17 NOTE — PROGRESS NOTE
Assessment and Plan


Assessment and plan: 


On 7/14/2022 I called [facility] at  and discussed with [facility] physician

Dr. Canseco and discussed in detail


Patient's condition, diagnosis, treatment of IM nailing and reduction of left 

hip fracture, and PT evaluation and recommendations of


Subacute rehab placement, I also informed that patient is stable to be 

discharged to subacute rehab.


She reported that [facility] case management will get in touch with her patient's 

 and make arrangements for transfer to subacute rehab.


I informed the nurse and the CM about the [facility] physicians discharge plans.








77-year-old female with history of hypertension was brought to the emergency 

room because of left hip pain 1 day.  Patient  was getting up from her couch 

and had a mechanical trip and fell to the left hip area.  Patient denies any 

other injuries or trauma.  No loss of consciousness, no headache dizziness, 

chest pain, or palpitations.  In the emergency room CT scan of the lower 

extremity shows acute fracture of the left proximal femur with mild impaction 

and moderate angulation.  Subsequently evaluated by orthopedic surgeon Dr. Valdez.and patient was admitted subsequently underwent closed reduction and IM 

nailing of left femur. Postop care per orthopedics surgeon





--Femur fracture, left


Admit the patient to the medical floor telemetry.  NPO.  D5 half-normal saline 

at the rate of 100 cc per hour.  Morphine 2 mg IV every 4 hours as needed.  

Tylenol 650 mg p.o. every 6 hours as needed.  Reconsult orthopedic Dr. Valdez to

see the patient for possible surgery





--S/P Closed reduction and IM nail placement;


Continue postop care per orthopedic surgeon recommendations


Pain medications, physical therapy and Occupational Therapy and rehab





--hypertension; uncontrolled probably due to pain


Pain management, continue antihypertensives and as needed medications


Hydralazine 10 mg IV every 6 hours as needed.  We continue the home medication





--History of fall


Patient is on fall precaution.  Physical therapy occupational therapy


And rehabilitation





--History of hyponatremia; improving gradually


D5 half-normal saline at the rate of 100 cc/h.  Monitor electrolytes





--Leukocytosis


Rocephin 2 g IV daily.  Recheck CBC in the morning





--Full CODE STATUS;





-- DVT prophylaxis


Heparin 5000 units subcu every 12 hours for DVT prophylaxis.  


Pepcid 20 mg IV every 12 hours for GI prophylaxis.





Continue postop care


Follow-up PT OT evaluation recommendations


Discharge planning per case management


Subacute/SNF placement/home with home health when medically stable


Follow-up with Ortho for recommendations





Patient is clinically stable for discharge


Awaiting subacute rehab placement per PT





Will try to contact [facility] physicians this afternoon


In process planning discharge to subacute rehab





Plan of care reviewed with patient and his nurse





7/13/2022;


I called , at [facility] representative called and said she would 

connect me with Dr. Gomez, however she informed me that the doctor is busy on 

the other line and she would call me back again tomorrow morning to discuss 

about the patient.We will try to contact [facility] physician tomorrow 





7/14/2022;.  We will try to contact [facility] physicians this afternoon to discuss 

the discharge planning





7/15/22; [facility]  and our  processing subacute rehab 

placement


Pending authorization





7/16/2022; awaiting subacute placement disposition per [facility]





7/17/2022; pending authorization for subacute placement


Medically stable for discharge











History


Interval history: 


I have seen and examined the patient at the bedside


Patient's chart and medications reviewed


No new events reported by the nursing


Vital signs noted








Hospitalist Physical





- Constitutional


Vitals: 


                                        











Temp Pulse Resp BP Pulse Ox


 


 98.7 F   85   16   157/79   97 


 


 07/17/22 04:47  07/17/22 04:47  07/17/22 04:47  07/17/22 04:47  07/17/22 04:47











General appearance: Present: no acute distress, well-nourished, obese





- EENT


Eyes: Present: PERRL, EOM intact





- Neck


Neck: Present: supple, normal ROM





- Respiratory


Respiratory effort: normal


Respiratory: bilateral: diminished, negative: rales, rhonchi, wheezing





- Cardiovascular


Rhythm: regular


Heart Sounds: Present: S1 & S2





- Extremities


Extremities: no ischemia, No edema





- Abdominal


General gastrointestinal: soft, non-tender, non-distended





- Integumentary


Integumentary: Present: clear, warm





- Psychiatric


Psychiatric: appropriate mood/affect, cooperative





- Neurologic


Neurologic: moves all extremities





Results





- Labs


CBC & Chem 7: 


                                 07/13/22 05:01





                                 07/14/22 05:17


Labs: 


                             Laboratory Last Values











WBC  14.3 K/mm3 (4.5-11.0)  H  07/12/22  05:32    


 


RBC  3.76 M/mm3 (3.65-5.03)   07/12/22  05:32    


 


Hgb  10.4 gm/dl (10.1-14.3)   07/13/22  05:01    


 


Hct  32.5 % (30.3-42.9)   07/13/22  05:01    


 


MCV  94 fl (79-97)   07/12/22  05:32    


 


MCH  31 pg (28-32)   07/12/22  05:32    


 


MCHC  33 % (30-34)   07/12/22  05:32    


 


RDW  14.4 % (13.2-15.2)   07/12/22  05:32    


 


Plt Count  195 K/mm3 (140-440)   07/12/22  05:32    


 


Lymph % (Auto)  8.8 % (13.4-35.0)  L  07/12/22  05:32    


 


Mono % (Auto)  4.8 % (0.0-7.3)   07/12/22  05:32    


 


Eos % (Auto)  2.7 % (0.0-4.3)   07/12/22  05:32    


 


Baso % (Auto)  0.6 % (0.0-1.8)   07/12/22  05:32    


 


Lymph # (Auto)  1.3 K/mm3 (1.2-5.4)   07/12/22  05:32    


 


Mono # (Auto)  0.7 K/mm3 (0.0-0.8)   07/12/22  05:32    


 


Eos # (Auto)  0.4 K/mm3 (0.0-0.4)   07/12/22  05:32    


 


Baso # (Auto)  0.1 K/mm3 (0.0-0.1)   07/12/22  05:32    


 


Seg Neutrophils %  83.1 % (40.0-70.0)  H  07/12/22  05:32    


 


Seg Neutrophils #  11.9 K/mm3 (1.8-7.7)  H  07/12/22  05:32    


 


Sodium  132 mmol/L (137-145)  L  07/14/22  05:17    


 


Potassium  4.3 mmol/L (3.6-5.0)   07/12/22  05:32    


 


Chloride  98.4 mmol/L ()   07/12/22  05:32    


 


Carbon Dioxide  22 mmol/L (22-30)   07/12/22  05:32    


 


Anion Gap  19 mmol/L  07/12/22  05:32    


 


BUN  24 mg/dL (7-17)  H  07/12/22  05:32    


 


Creatinine  1.1 mg/dL (0.6-1.2)   07/12/22  05:32    


 


Estimated GFR  58 ml/min  07/12/22  05:32    


 


BUN/Creatinine Ratio  22 %  07/12/22  05:32    


 


Glucose  96 mg/dL ()   07/12/22  05:32    


 


Calcium  9.5 mg/dL (8.4-10.2)   07/12/22  05:32    


 


Total Bilirubin  0.90 mg/dL (0.1-1.2)   07/12/22  05:32    


 


AST  27 units/L (5-40)   07/12/22  05:32    


 


ALT  15 units/L (7-56)   07/12/22  05:32    


 


Alkaline Phosphatase  85 units/L ()   07/12/22  05:32    


 


Total Protein  6.8 g/dL (6.3-8.2)   07/12/22  05:32    


 


Albumin  3.6 g/dL (3.9-5)  L  07/12/22  05:32    


 


Albumin/Globulin Ratio  1.1 %  07/12/22  05:32    











Paul/IV: 


                                        





Voiding Method                   External Female Catheter











Active Medications





- Current Medications


Current Medications: 














Generic Name Dose Route Start Last Admin





  Trade Name Freq  PRN Reason Stop Dose Admin


 


Acetaminophen  650 mg  07/11/22 22:39  07/16/22 12:04





  Acetaminophen 325 Mg Tab  PO   650 mg





  Q4H PRN   Administration





  Pain MILD(1-3)/Fever >100.5/HA  


 


Albuterol  2.5 mg  07/11/22 22:39 





  Albuterol 2.5 Mg/3 Ml Nebu  IH  





  Q3HRT PRN  





  Shortness Of Breath  


 


Cetirizine HCl  10 mg  07/17/22 10:00  07/17/22 09:20





  Cetirizine 10 Mg Tab  PO   10 mg





  QDAY LEA   Administration


 


Enoxaparin Sodium  40 mg  07/13/22 10:00  07/17/22 09:20





  Enoxaparin 40 Mg/0.4 Ml Inj  SUB-Q   40 mg





  QDAY LEA   Administration


 


Famotidine  10 mg  07/13/22 10:00  07/17/22 09:20





  Famotidine 10 Mg Tab  PO   10 mg





  BID LEA   Administration


 


Hydralazine HCl  10 mg  07/12/22 04:17  07/13/22 04:56





  Hydralazine 20 Mg/1 Ml Inj  IV   10 mg





  PRN PRN   Administration





  Hypertension  


 


Ibuprofen  800 mg  07/12/22 15:20 





  Ibuprofen 800 Mg Tab  PO  





  Q8H PRN  





  Pain, Moderate (4-6)  


 


Ketorolac Tromethamine  15 mg  07/12/22 15:20  07/16/22 22:06





  Ketorolac 30 Mg/1 Ml Inj  IV  07/17/22 15:19  15 mg





  Q6H PRN   Administration





  Pain, Moderate (4-6)  


 


Morphine Sulfate  2 mg  07/12/22 15:20 





  Morphine 2 Mg/1 Ml Inj  IV  





  Q4H PRN  





  Pain, Moderate (4-6)  


 


Morphine Sulfate  4 mg  07/12/22 15:20  07/14/22 19:39





  Morphine 4 Mg/1 Ml Inj  IV   4 mg





  Q4H PRN   Administration





  Pain , Severe (7-10)  


 


Ondansetron HCl  4 mg  07/11/22 22:39 





  Ondansetron 4 Mg/2 Ml Inj  IV  





  Q8H PRN  





  Nausea And Vomiting  


 


Sodium Chloride  10 ml  07/12/22 10:00  07/17/22 09:20





  Sodium Chloride 0.9% 10 Ml Flush Syringe  IV   10 ml





  BID LEA   Administration


 


Sodium Chloride  10 ml  07/11/22 22:39 





  Sodium Chloride 0.9% 10 Ml Flush Syringe  IV  





  PRN PRN  





  LINE FLUSH  


 


Sodium Chloride  10 ml  07/12/22 16:00 





  Sodium Chloride 0.9% 10 Ml Flush Syringe  IV  07/22/22 23:59 





  PRN NR

## 2022-07-18 LAB
BASOPHILS # (AUTO): 0 K/MM3 (ref 0–0.1)
BASOPHILS NFR BLD AUTO: 0.4 % (ref 0–1.8)
BUN SERPL-MCNC: 17 MG/DL (ref 7–17)
BUN/CREAT SERPL: 15 %
CALCIUM SERPL-MCNC: 9 MG/DL (ref 8.4–10.2)
EOSINOPHIL # BLD AUTO: 0.3 K/MM3 (ref 0–0.4)
EOSINOPHIL NFR BLD AUTO: 2.7 % (ref 0–4.3)
HCT VFR BLD CALC: 28.7 % (ref 30.3–42.9)
HEMOLYSIS INDEX: 1
HGB BLD-MCNC: 9.2 GM/DL (ref 10.1–14.3)
LYMPHOCYTES # BLD AUTO: 2.1 K/MM3 (ref 1.2–5.4)
LYMPHOCYTES NFR BLD AUTO: 22.1 % (ref 13.4–35)
MCHC RBC AUTO-ENTMCNC: 32 % (ref 30–34)
MCV RBC AUTO: 96 FL (ref 79–97)
MONOCYTES # (AUTO): 0.6 K/MM3 (ref 0–0.8)
MONOCYTES % (AUTO): 6.9 % (ref 0–7.3)
PLATELET # BLD: 234 K/MM3 (ref 140–440)
RBC # BLD AUTO: 2.98 M/MM3 (ref 3.65–5.03)

## 2022-07-18 RX ADMIN — IBUPROFEN PRN MG: 800 TABLET, FILM COATED ORAL at 20:04

## 2022-07-18 RX ADMIN — FAMOTIDINE SCH MG: 10 TABLET ORAL at 09:35

## 2022-07-18 RX ADMIN — MORPHINE SULFATE PRN MG: 2 INJECTION, SOLUTION INTRAMUSCULAR; INTRAVENOUS at 05:17

## 2022-07-18 RX ADMIN — HYDRALAZINE HYDROCHLORIDE PRN MG: 20 INJECTION INTRAMUSCULAR; INTRAVENOUS at 22:19

## 2022-07-18 RX ADMIN — FAMOTIDINE SCH MG: 10 TABLET ORAL at 21:36

## 2022-07-18 RX ADMIN — ENOXAPARIN SODIUM SCH MG: 100 INJECTION SUBCUTANEOUS at 09:35

## 2022-07-18 RX ADMIN — CETIRIZINE HYDROCHLORIDE SCH MG: 10 TABLET, FILM COATED ORAL at 09:35

## 2022-07-18 RX ADMIN — Medication SCH ML: at 09:39

## 2022-07-18 RX ADMIN — Medication SCH ML: at 21:36

## 2022-07-18 RX ADMIN — IBUPROFEN PRN MG: 800 TABLET, FILM COATED ORAL at 10:21

## 2022-07-18 NOTE — DISCHARGE SUMMARY
Providers





- Providers


Date of Admission: 


07/11/22 22:39





Date of discharge: 07/18/22


Attending physician: 


AMY J KOCHERLA





                                        





07/11/22 14:47


Consult to Physician [CONS] Stat 


   Comment: 


   Consulting Provider: CHARLEY VALDEZ


   Physician Instructions: 


   Reason For Exam: hip fx





07/12/22 15:20


Consult to Case Management [CONS] Routine 


   Services Needed at Discharge: Other


   Notified:: yes


   Additional Physician Instructions: Assess Discharge needs.


Physical Therapy Evaluation and Treat [CONS] Routine 


   Comment: 


   Reason For Exam: Eval and Treat





07/14/22 10:21


Occupational Therapy Evaluate and Treat [CONS] Routine 


   Comment: 


   Reason For Exam: post op left hip surgery











Primary care physician: 


PRIMARY CARE MD








Hospitalization


Reason for admission: History of fall, left  femur neck fracture


Condition: Stable


Pertinent studies: 





CT left lower extremity acute basicervical fracture of the left proximal femur 

with mild impaction and moderate angulation


CT lumbar spine moderate level curvature of the lumbar spine with moderate to 

severe multilevel degenerative changes, L3-4 and L4-5 appear to be the most 

affected levels, no acute injury 





left femur x-ray; proximal left femur fracture involving basicervical neck 

extending into intertrochanteric and greater trochanteric regions


Procedures: 


Left femur fracture


S/p closed reduction and IM nail placement





Hospital course: 


77-year-old female with history of hypertension was brought to the emergency 

room because of left hip pain 1 day.  Patient  was getting up from her couch 

and had a mechanical trip and fell to the left hip area.  Patient denies any 

other injuries or trauma.  No loss of consciousness, no headache dizziness, 

chest pain, or palpitations.  In the emergency room CT scan of the lower 

extremity shows acute fracture of the left proximal femur with mild impaction 

and moderate angulation.  Subsequently evaluated by orthopedic surgeon Dr. Valdez.and patient was admitted subsequently underwent closed reduction and IM 

nailing of left femur. 





Patient, had uncomplicated postoperative period. PT OT evaluated the patient and

 recommended subacute rehab placement.  [place] physician team was following this

 patient and discharge planning, today patient is being transferred to subacute 

rehab for rehabilitation.Patient is comfortable no new complaints,


Patient is hemodynamically and clinically stable at discharge.





Discharge diagnosis:


--History of fall


Patient is on fall precaution.  Physical therapy occupational therapy


And rehabilitation





--Femur fracture, left


Admit the patient to the medical floor telemetry.  NPO.  D5 half-normal saline 

at the rate of 100 cc per hour.  Morphine 2 mg IV every 4 hours as needed.  

Tylenol 650 mg p.o. every 6 hours as needed.  Reconsult orthopedic Dr. Valdez to

 see the patient for possible surgery





--S/P Closed reduction and IM nail placement;


Continue postop care per orthopedic surgeon recommendations


Pain medications, physical therapy and Occupational Therapy and rehab





--hypertension; uncontrolled probably due to pain


Pain management, continue antihypertensives and as needed medications


Hydralazine 10 mg IV every 6 hours as needed.  We continue the home medication





--History of hyponatremia; improving gradually


D5 half-normal saline at the rate of 100 cc/h.  Monitor electrolytes





--Leukocytosis


Rocephin 2 g IV daily.  Recheck CBC in the morning





--Full CODE STATUS;





-- DVT prophylaxis


Eliquis 2.5 mg twice a day for 35 days





Patient was waiting for authorization from insurance which was approved 

yesterday evening


Patient is being discharged to subacute rehab facility today


Stable at discharge.








Disposition: [facility]


Final Discharge Diagnosis (Prints w/discharge instructions): History of fall.  

Left femur fracture.  s/p Closed reduction and IM nail placement.  Hypertension 

moderate control.  History of hyponatremia/improved.  Leukocytosis/resolved.  

Obesity BMI 31.9


Time spent for discharge: 35 minutes





Core Measure Documentation





- Palliative Care


Palliative Care/ Comfort Measures: Not Applicable





- Core Measures


Any of the following diagnoses?: none





Exam





- Constitutional


Vitals: 


                                        











Temp Pulse Resp BP Pulse Ox


 


 98.8 F   89   16   148/64   97 


 


 07/18/22 04:31 07/18/22 04:31 07/18/22 04:31 07/18/22 04:31 07/18/22 11:46











General appearance: Present: no acute distress, well-nourished





- EENT


Eyes: Present: PERRL, EOM intact





- Neck


Neck: Present: supple, normal ROM





- Respiratory


Respiratory effort: normal


Respiratory: bilateral: diminished, negative: rales, rhonchi, wheezing





- Cardiovascular


Rhythm: regular


Heart Sounds: Present: S1 & S2





- Extremities


Extremities: no ischemia, No edema





- Abdominal


General gastrointestinal: Present: soft, non-tender, non-distended, normal bowel

 sounds





- Integumentary


Integumentary: Present: clear, warm





- Musculoskeletal


Musculoskeletal: strength equal bilaterally





- Psychiatric


Psychiatric: appropriate mood/affect, cooperative





- Neurologic


Neurologic: CNII-XII intact, moves all extremities





Plan


Activity: advance as tolerated, fall precautions


Diet: other (Cardiac diet)


Special Instructions: physical therapy, occupational therapy


Additional Instructions: Follow-up with primary care physician in 1 to 2 weeks. 

 Follow orthopedic surgeon in 2 weeks.  fall precautions.  If you notice any 

bleeding because of blood thinners stop the medications contact MD or go to the 

nearest emergency room as needed.  If you have worsening symptoms contact MD or 

go to the nearest emergency room as needed.


Follow up with: 


PRIMARY CARE,MD [Primary Care Provider] - 3-5 Days


Prescriptions: 


Apixaban [Eliquis] 2.5 mg PO BID 35 Days #70


Ibuprofen [Motrin 800 MG tab] 800 mg PO Q8H PRN #30 tablet


 PRN Reason: Pain, Moderate (4-6)


Famotidine [Pepcid] 10 mg PO BID #30 tablet

## 2022-07-18 NOTE — PROGRESS NOTE
Assessment and Plan


Assessment and plan: 


On 7/14/2022 I called [facility] at  and discussed with [facility] physician

Dr. Canseco and discussed in detail


Patient's condition, diagnosis, treatment of IM nailing and reduction of left 

hip fracture, and PT evaluation and recommendations of


Subacute rehab placement, I also informed that patient is stable to be 

discharged to subacute rehab.


She reported that [facility] case management will get in touch with her patient's 

 and make arrangements for transfer to subacute rehab.


I informed the nurse and the CM about the [facility] physicians discharge plans.








77-year-old female with history of hypertension was brought to the emergency 

room because of left hip pain 1 day.  Patient  was getting up from her couch 

and had a mechanical trip and fell to the left hip area.  Patient denies any 

other injuries or trauma.  No loss of consciousness, no headache dizziness, 

chest pain, or palpitations.  In the emergency room CT scan of the lower 

extremity shows acute fracture of the left proximal femur with mild impaction 

and moderate angulation.  Subsequently evaluated by orthopedic surgeon Dr. Valdez.and patient was admitted subsequently underwent closed reduction and IM 

nailing of left femur. Postop care per orthopedics surgeon





--Femur fracture, left


Admit the patient to the medical floor telemetry.  NPO.  D5 half-normal saline 

at the rate of 100 cc per hour.  Morphine 2 mg IV every 4 hours as needed.  

Tylenol 650 mg p.o. every 6 hours as needed.  Reconsult orthopedic Dr. Valdez to

see the patient for possible surgery





--S/P Closed reduction and IM nail placement;


Continue postop care per orthopedic surgeon recommendations


Pain medications, physical therapy and Occupational Therapy and rehab





--hypertension; uncontrolled probably due to pain


Pain management, continue antihypertensives and as needed medications


Hydralazine 10 mg IV every 6 hours as needed.  We continue the home medication





--History of fall


Patient is on fall precaution.  Physical therapy occupational therapy


And rehabilitation





--History of hyponatremia; improving gradually


D5 half-normal saline at the rate of 100 cc/h.  Monitor electrolytes





--Leukocytosis


Rocephin 2 g IV daily.  Recheck CBC in the morning





--Full CODE STATUS;





-- DVT prophylaxis


Heparin 5000 units subcu every 12 hours for DVT prophylaxis.  


Pepcid 20 mg IV every 12 hours for GI prophylaxis.





Continue postop care


Follow-up PT OT evaluation recommendations


Discharge planning per case management


Subacute/SNF placement/home with home health when medically stable


Follow-up with Ortho for recommendations





Patient is clinically stable for discharge


Awaiting subacute rehab placement per PT





Will try to contact [facility] physicians this afternoon


In process planning discharge to subacute rehab





Plan of care reviewed with patient and his nurse





7/13;


I called , at [facility] representative called and said she would 

connect me with Dr. Gomez, however she informed me that the doctor is busy on 

the other line and she would call me back again tomorrow morning to discuss 

about the patient.We will try to contact [facility] physician tomorrow 





7/14;.  We will try to contact [facility] physicians this afternoon to discuss the 

discharge planning





7/15; [facility]  and our  processing subacute rehab p

lacement


Pending authorization





7/16; awaiting subacute placement disposition per [facility]





7/17; pending authorization for subacute placement


Medically stable for discharge





7/18; pending authorization for subacute rehab placement


Possible discharge in 1 to 2 days if placement is authorized and approved


COVID-19 test prior to discharge














History


Interval history: 


I have seen and examined the patient at the bedside


Patient's chart and medications reviewed


No new events reported by the nursing staff


Awaiting placement


Patient has no new complaints


Vital signs noted








Hospitalist Physical





- Constitutional


Vitals: 


                                        











Temp Pulse Resp BP Pulse Ox


 


 98.8 F   89   16   148/64   97 


 


 07/18/22 04:31  07/18/22 04:31  07/18/22 04:31  07/18/22 04:31  07/18/22 11:46











General appearance: Present: no acute distress, well-nourished, obese





- EENT


Eyes: Present: PERRL, EOM intact





- Neck


Neck: Present: supple, normal ROM





- Respiratory


Respiratory effort: normal


Respiratory: bilateral: diminished, negative: rales, rhonchi, wheezing





- Cardiovascular


Rhythm: regular


Heart Sounds: Present: S1 & S2





- Extremities


Extremities: no ischemia, No edema





- Abdominal


General gastrointestinal: soft, non-tender, non-distended, normal bowel sounds





- Integumentary


Integumentary: Present: clear, warm





- Psychiatric


Psychiatric: appropriate mood/affect, cooperative





- Neurologic


Neurologic: CNII-XII intact, moves all extremities





Results





- Labs


CBC & Chem 7: 


                                 07/18/22 05:05





                                 07/18/22 05:05


Labs: 


                             Laboratory Last Values











WBC  9.4 K/mm3 (4.5-11.0)   07/18/22  05:05    


 


RBC  2.98 M/mm3 (3.65-5.03)  L  07/18/22  05:05    


 


Hgb  9.2 gm/dl (10.1-14.3)  L  07/18/22  05:05    


 


Hct  28.7 % (30.3-42.9)  L  07/18/22  05:05    


 


MCV  96 fl (79-97)   07/18/22  05:05    


 


MCH  31 pg (28-32)   07/18/22  05:05    


 


MCHC  32 % (30-34)   07/18/22  05:05    


 


RDW  13.7 % (13.2-15.2)   07/18/22  05:05    


 


Plt Count  234 K/mm3 (140-440)   07/18/22  05:05    


 


Lymph % (Auto)  22.1 % (13.4-35.0)   07/18/22  05:05    


 


Mono % (Auto)  6.9 % (0.0-7.3)   07/18/22  05:05    


 


Eos % (Auto)  2.7 % (0.0-4.3)   07/18/22  05:05    


 


Baso % (Auto)  0.4 % (0.0-1.8)   07/18/22  05:05    


 


Lymph # (Auto)  2.1 K/mm3 (1.2-5.4)   07/18/22  05:05    


 


Mono # (Auto)  0.6 K/mm3 (0.0-0.8)   07/18/22  05:05    


 


Eos # (Auto)  0.3 K/mm3 (0.0-0.4)   07/18/22  05:05    


 


Baso # (Auto)  0.0 K/mm3 (0.0-0.1)   07/18/22  05:05    


 


Seg Neutrophils %  67.9 % (40.0-70.0)   07/18/22  05:05    


 


Seg Neutrophils #  6.4 K/mm3 (1.8-7.7)   07/18/22  05:05    


 


Sodium  134 mmol/L (137-145)  L  07/18/22  05:05    


 


Potassium  4.0 mmol/L (3.6-5.0)   07/18/22  05:05    


 


Chloride  97.2 mmol/L ()  L  07/18/22  05:05    


 


Carbon Dioxide  25 mmol/L (22-30)   07/18/22  05:05    


 


Anion Gap  16 mmol/L  07/18/22  05:05    


 


BUN  17 mg/dL (7-17)   07/18/22  05:05    


 


Creatinine  1.1 mg/dL (0.6-1.2)   07/18/22  05:05    


 


Estimated GFR  58 ml/min  07/18/22  05:05    


 


BUN/Creatinine Ratio  15 %  07/18/22  05:05    


 


Glucose  133 mg/dL ()  H  07/18/22  05:05    


 


Calcium  9.0 mg/dL (8.4-10.2)   07/18/22  05:05    


 


Magnesium  1.90 mg/dL (1.7-2.3)   07/18/22  05:05    


 


Total Bilirubin  0.90 mg/dL (0.1-1.2)   07/12/22  05:32    


 


AST  27 units/L (5-40)   07/12/22  05:32    


 


ALT  15 units/L (7-56)   07/12/22  05:32    


 


Alkaline Phosphatase  85 units/L ()   07/12/22  05:32    


 


Total Protein  6.8 g/dL (6.3-8.2)   07/12/22  05:32    


 


Albumin  3.6 g/dL (3.9-5)  L  07/12/22  05:32    


 


Albumin/Globulin Ratio  1.1 %  07/12/22  05:32    











Paul/IV: 


                                        





Voiding Method                   External Female Catheter











Active Medications





- Current Medications


Current Medications: 














Generic Name Dose Route Start Last Admin





  Trade Name Freq  PRN Reason Stop Dose Admin


 


Acetaminophen  650 mg  07/11/22 22:39  07/16/22 12:04





  Acetaminophen 325 Mg Tab  PO   650 mg





  Q4H PRN   Administration





  Pain MILD(1-3)/Fever >100.5/HA  


 


Albuterol  2.5 mg  07/11/22 22:39 





  Albuterol 2.5 Mg/3 Ml Nebu  IH  





  Q3HRT PRN  





  Shortness Of Breath  


 


Cetirizine HCl  10 mg  07/17/22 10:00  07/18/22 09:35





  Cetirizine 10 Mg Tab  PO   10 mg





  QDAY LEA   Administration


 


Enoxaparin Sodium  40 mg  07/13/22 10:00  07/18/22 09:35





  Enoxaparin 40 Mg/0.4 Ml Inj  SUB-Q   40 mg





  QDAY LEA   Administration


 


Famotidine  10 mg  07/13/22 10:00  07/18/22 09:35





  Famotidine 10 Mg Tab  PO   10 mg





  BID LEA   Administration


 


Hydralazine HCl  10 mg  07/12/22 04:17  07/13/22 04:56





  Hydralazine 20 Mg/1 Ml Inj  IV   10 mg





  PRN PRN   Administration





  Hypertension  


 


Ibuprofen  800 mg  07/12/22 15:20  07/18/22 10:21





  Ibuprofen 800 Mg Tab  PO   800 mg





  Q8H PRN   Administration





  Pain, Moderate (4-6)  


 


Morphine Sulfate  2 mg  07/12/22 15:20  07/18/22 05:17





  Morphine 2 Mg/1 Ml Inj  IV   2 mg





  Q4H PRN   Administration





  Pain, Moderate (4-6)  


 


Morphine Sulfate  4 mg  07/12/22 15:20  07/14/22 19:39





  Morphine 4 Mg/1 Ml Inj  IV   4 mg





  Q4H PRN   Administration





  Pain , Severe (7-10)  


 


Ondansetron HCl  4 mg  07/11/22 22:39 





  Ondansetron 4 Mg/2 Ml Inj  IV  





  Q8H PRN  





  Nausea And Vomiting  


 


Sodium Chloride  10 ml  07/12/22 10:00  07/18/22 09:39





  Sodium Chloride 0.9% 10 Ml Flush Syringe  IV   10 ml





  BID LEA   Administration


 


Sodium Chloride  10 ml  07/11/22 22:39 





  Sodium Chloride 0.9% 10 Ml Flush Syringe  IV  





  PRN PRN  





  LINE FLUSH  


 


Sodium Chloride  10 ml  07/12/22 16:00 





  Sodium Chloride 0.9% 10 Ml Flush Syringe  IV  07/22/22 23:59 





  PRN NR  














Nutrition/Malnutrition Assess





- Dietary Evaluation


Nutrition/Malnutrition Findings: 


                                        





Nutrition Notes                                            Start:  07/17/22 

12:16


Freq:                                                      Status: Active       




Protocol:                                                                       




 Document     07/17/22 12:16  RS  (Rec: 07/17/22 12:26  RS  [id number])


 Nutrition Notes


     Need for Assessment generated from:         RN Referral


     Initial or Follow up                        Assessment


     Current Diagnosis                           Hypertension


     Other Pertinent Diagnosis                   Fx femur


     Current Diet                                Cardiac Diet


     Labs/Tests                                  No recent labs


     Pertinent Medications                       reviewed


     Height                                      5 ft 3 in


     Weight                                      81.647 kg


     Ideal Body Weight (kg)                      52.27


     BMI                                         31.8


     Intake Prior to Admission                   Good


     Weight change and time frame                MANUEL wt hx, poor historian


     Weight Status                               Overweight


     Subjective/Other Information                RN referral for skin risk


                                                 assessment. Dino score 18.


                                                 Per diet recall pt consuming ~


                                                 25% of meal tray. Pt reports


                                                 disliking hospital food, has


                                                 poor appeitte, and denies


                                                 wanting ONS. RD provided


                                                 education on importance of


                                                 consuming HBV protein in


                                                 relation to medical condition.


                                                 Pt verbalized understanding.


                                                 Pt tolerating PT.


     Percent of energy/protein needs met:        34%/26%


     Burn                                        Absent


     Trauma                                      Absent


     GI Symptoms                                 None


     Current % PO                                Negligible


     Minimum of two criteria                     No


     #1


      Nutrition Diagnosis                        Increased nutrient needs   (


                                                 specify in comment below)


      Comments:                                  protein


      Etiology                                   fx femur


      As Evidenced by Signs and Symptoms         pt only eating ~25% of meal


                                                 tray, pt recieving only 34% of


                                                 kcal needs and 26% of Pro


                                                 needs via PO intake,reports


                                                 poor appetite, and denies


                                                 wanting ONS


     Is patient on ventilator?                   No


     Is Patient Ambulatory and/or Out of Bed     Yes


     REE-([facility]-ambulatory/OOB) [     [id number]


      [email or web address]]                               


     Calculation Used for Recommendations        [facility]


     Additional Notes                            Protein: 82-98g/day (1-1.2g/KG


                                                 BW/day)


                                                 Fluid needs: 1mL/kcal or per


                                                 MD


 Nutrition Intervention


     Goal #1                                     Pt will meet at least 75% of


                                                 kcal/protein needs via PO


                                                 intake


     Anticipated Discharge Needs:                Cardiac Diet


     Follow-Up By:                               07/19/22


     Additional Comments                         F/U for appetite changes and


                                                 PO intakes

## 2022-07-19 VITALS — DIASTOLIC BLOOD PRESSURE: 77 MMHG | SYSTOLIC BLOOD PRESSURE: 149 MMHG

## 2022-07-19 RX ADMIN — Medication SCH ML: at 09:24

## 2022-07-19 RX ADMIN — IBUPROFEN PRN MG: 800 TABLET, FILM COATED ORAL at 03:26

## 2022-07-19 RX ADMIN — CETIRIZINE HYDROCHLORIDE SCH MG: 10 TABLET, FILM COATED ORAL at 09:24

## 2022-07-19 RX ADMIN — FAMOTIDINE SCH MG: 10 TABLET ORAL at 09:24

## 2022-07-19 RX ADMIN — IBUPROFEN PRN MG: 800 TABLET, FILM COATED ORAL at 12:39

## 2022-07-19 RX ADMIN — ENOXAPARIN SODIUM SCH MG: 100 INJECTION SUBCUTANEOUS at 09:24
